# Patient Record
Sex: FEMALE | Race: WHITE | NOT HISPANIC OR LATINO | Employment: FULL TIME | URBAN - METROPOLITAN AREA
[De-identification: names, ages, dates, MRNs, and addresses within clinical notes are randomized per-mention and may not be internally consistent; named-entity substitution may affect disease eponyms.]

---

## 2020-02-21 LAB
INFLUENZA A ANTIGEN, POC: NEGATIVE
INFLUENZA B ANTIGEN, POC: NEGATIVE

## 2020-02-22 LAB
INFLUENZA A ANTIGEN, POC: NEGATIVE
INFLUENZA B ANTIGEN, POC: NEGATIVE

## 2020-06-05 ENCOUNTER — HISTORICAL (OUTPATIENT)
Dept: ADMINISTRATIVE | Facility: HOSPITAL | Age: 40
End: 2020-06-05

## 2020-06-05 LAB
ABS NEUT (OLG): 2.57 X10(3)/MCL (ref 2.1–9.2)
ALBUMIN SERPL-MCNC: 4.1 GM/DL (ref 3.5–5)
ALBUMIN/GLOB SERPL: 1.5 RATIO (ref 1.1–2)
ALP SERPL-CCNC: 46 UNIT/L (ref 40–150)
ALT SERPL-CCNC: 11 UNIT/L (ref 0–55)
ANTINUCLEAR ANTIBODY SCREEN (OHS): NEGATIVE
AST SERPL-CCNC: 13 UNIT/L (ref 5–34)
BASOPHILS # BLD AUTO: 0 X10(3)/MCL (ref 0–0.2)
BASOPHILS NFR BLD AUTO: 1 %
BILIRUB SERPL-MCNC: 0.9 MG/DL
BILIRUBIN DIRECT+TOT PNL SERPL-MCNC: 0.3 MG/DL (ref 0–0.5)
BILIRUBIN DIRECT+TOT PNL SERPL-MCNC: 0.6 MG/DL (ref 0–0.8)
BUN SERPL-MCNC: 19.3 MG/DL (ref 7–18.7)
CALCIUM SERPL-MCNC: 8.7 MG/DL (ref 8.4–10.2)
CHLORIDE SERPL-SCNC: 104 MMOL/L (ref 98–107)
CHOLEST SERPL-MCNC: 182 MG/DL
CHOLEST/HDLC SERPL: 3 {RATIO} (ref 0–5)
CO2 SERPL-SCNC: 27 MMOL/L (ref 22–29)
CREAT SERPL-MCNC: 0.84 MG/DL (ref 0.55–1.02)
CRP SERPL HS-MCNC: <0.2 MG/DL
DEPRECATED CALCIDIOL+CALCIFEROL SERPL-MC: 73.2 NG/ML (ref 6.6–49.9)
DSDNA ANTIBODY (OHS): NEGATIVE
EOSINOPHIL # BLD AUTO: 0.1 X10(3)/MCL (ref 0–0.9)
EOSINOPHIL NFR BLD AUTO: 3 %
ERYTHROCYTE [DISTWIDTH] IN BLOOD BY AUTOMATED COUNT: 12.6 % (ref 11.5–17)
ERYTHROCYTE [SEDIMENTATION RATE] IN BLOOD: 4 MM/HR (ref 0–20)
GLOBULIN SER-MCNC: 2.7 GM/DL (ref 2.4–3.5)
GLUCOSE SERPL-MCNC: 81 MG/DL (ref 74–100)
HCT VFR BLD AUTO: 40.4 % (ref 37–47)
HDLC SERPL-MCNC: 65 MG/DL (ref 35–60)
HGB BLD-MCNC: 13.2 GM/DL (ref 12–16)
LDLC SERPL CALC-MCNC: 106 MG/DL (ref 50–140)
LYMPHOCYTES # BLD AUTO: 1.3 X10(3)/MCL (ref 0.6–4.6)
LYMPHOCYTES NFR BLD AUTO: 30 %
MAGNESIUM SERPL-MCNC: 2.04 MG/DL (ref 1.6–2.6)
MCH RBC QN AUTO: 31.4 PG (ref 27–31)
MCHC RBC AUTO-ENTMCNC: 32.7 GM/DL (ref 33–36)
MCV RBC AUTO: 96 FL (ref 80–94)
MONOCYTES # BLD AUTO: 0.4 X10(3)/MCL (ref 0.1–1.3)
MONOCYTES NFR BLD AUTO: 9 %
NEUTROPHILS # BLD AUTO: 2.57 X10(3)/MCL (ref 2.1–9.2)
NEUTROPHILS NFR BLD AUTO: 58 %
PHOSPHATE SERPL-MCNC: 3.7 MG/DL (ref 2.3–4.7)
PLATELET # BLD AUTO: 181 X10(3)/MCL (ref 130–400)
PMV BLD AUTO: 10.3 FL (ref 9.4–12.4)
POTASSIUM SERPL-SCNC: 4.1 MMOL/L (ref 3.5–5.1)
PROGEST SERPL-MCNC: <0.5 NG/ML
PROT SERPL-MCNC: 6.8 GM/DL (ref 6.4–8.3)
RBC # BLD AUTO: 4.21 X10(6)/MCL (ref 4.2–5.4)
RHEUMATOID FACT SERPL-ACNC: <13 IU/ML
SODIUM SERPL-SCNC: 141 MMOL/L (ref 136–145)
T3FREE SERPL-MCNC: 3.56 PG/ML (ref 1.71–3.71)
T4 FREE SERPL-MCNC: 1.06 NG/DL (ref 0.7–1.48)
TRIGL SERPL-MCNC: 57 MG/DL (ref 37–140)
TSH SERPL-ACNC: 0.04 UIU/ML (ref 0.35–4.94)
VIT B12 SERPL-MCNC: 993 PG/ML (ref 213–816)
VLDLC SERPL CALC-MCNC: 11 MG/DL
WBC # SPEC AUTO: 4.4 X10(3)/MCL (ref 4.5–11.5)

## 2020-07-27 ENCOUNTER — HISTORICAL (OUTPATIENT)
Dept: ADMINISTRATIVE | Facility: HOSPITAL | Age: 40
End: 2020-07-27

## 2020-07-27 LAB
T3FREE SERPL-MCNC: 3.5 PG/ML (ref 1.71–3.71)
T4 FREE SERPL-MCNC: 1.03 NG/DL (ref 0.7–1.48)
TSH SERPL-ACNC: 0.33 UIU/ML (ref 0.35–4.94)

## 2020-11-27 ENCOUNTER — HISTORICAL (OUTPATIENT)
Dept: ADMINISTRATIVE | Facility: HOSPITAL | Age: 40
End: 2020-11-27

## 2020-11-27 LAB
T3FREE SERPL-MCNC: 3.1 PG/ML (ref 1.71–3.71)
T4 FREE SERPL-MCNC: 0.94 NG/DL (ref 0.7–1.48)
TSH SERPL-ACNC: 0.29 UIU/ML (ref 0.35–4.94)

## 2021-07-21 ENCOUNTER — HISTORICAL (OUTPATIENT)
Dept: ADMINISTRATIVE | Facility: HOSPITAL | Age: 41
End: 2021-07-21

## 2021-07-21 LAB
T3FREE SERPL-MCNC: 3.12 PG/ML (ref 1.58–3.91)
T4 FREE SERPL-MCNC: 0.91 NG/DL (ref 0.7–1.48)
TSH SERPL-ACNC: 1.82 UIU/ML (ref 0.35–4.94)

## 2022-04-10 ENCOUNTER — HISTORICAL (OUTPATIENT)
Dept: ADMINISTRATIVE | Facility: HOSPITAL | Age: 42
End: 2022-04-10

## 2022-04-28 VITALS
OXYGEN SATURATION: 99 % | BODY MASS INDEX: 24.68 KG/M2 | DIASTOLIC BLOOD PRESSURE: 67 MMHG | SYSTOLIC BLOOD PRESSURE: 107 MMHG | WEIGHT: 125.69 LBS | HEIGHT: 60 IN

## 2022-09-12 ENCOUNTER — OFFICE VISIT (OUTPATIENT)
Dept: URGENT CARE | Facility: CLINIC | Age: 42
End: 2022-09-12
Payer: COMMERCIAL

## 2022-09-12 VITALS
DIASTOLIC BLOOD PRESSURE: 81 MMHG | RESPIRATION RATE: 20 BRPM | OXYGEN SATURATION: 100 % | WEIGHT: 115 LBS | HEART RATE: 81 BPM | HEIGHT: 60 IN | TEMPERATURE: 99 F | SYSTOLIC BLOOD PRESSURE: 122 MMHG | BODY MASS INDEX: 22.58 KG/M2

## 2022-09-12 DIAGNOSIS — J01.90 ACUTE RHINOSINUSITIS: Primary | ICD-10-CM

## 2022-09-12 DIAGNOSIS — R09.81 CONGESTION OF NASAL SINUS: ICD-10-CM

## 2022-09-12 LAB
CTP QC/QA: YES
SARS-COV-2 RDRP RESP QL NAA+PROBE: NEGATIVE

## 2022-09-12 PROCEDURE — 1160F RVW MEDS BY RX/DR IN RCRD: CPT | Mod: CPTII,,, | Performed by: FAMILY MEDICINE

## 2022-09-12 PROCEDURE — 3079F DIAST BP 80-89 MM HG: CPT | Mod: CPTII,,, | Performed by: FAMILY MEDICINE

## 2022-09-12 PROCEDURE — 1159F MED LIST DOCD IN RCRD: CPT | Mod: CPTII,,, | Performed by: FAMILY MEDICINE

## 2022-09-12 PROCEDURE — 99213 OFFICE O/P EST LOW 20 MIN: CPT | Mod: 25,,, | Performed by: FAMILY MEDICINE

## 2022-09-12 PROCEDURE — 1160F PR REVIEW ALL MEDS BY PRESCRIBER/CLIN PHARMACIST DOCUMENTED: ICD-10-PCS | Mod: CPTII,,, | Performed by: FAMILY MEDICINE

## 2022-09-12 PROCEDURE — U0002 COVID-19 LAB TEST NON-CDC: HCPCS | Mod: QW,,, | Performed by: FAMILY MEDICINE

## 2022-09-12 PROCEDURE — 3074F PR MOST RECENT SYSTOLIC BLOOD PRESSURE < 130 MM HG: ICD-10-PCS | Mod: CPTII,,, | Performed by: FAMILY MEDICINE

## 2022-09-12 PROCEDURE — 3074F SYST BP LT 130 MM HG: CPT | Mod: CPTII,,, | Performed by: FAMILY MEDICINE

## 2022-09-12 PROCEDURE — 3008F PR BODY MASS INDEX (BMI) DOCUMENTED: ICD-10-PCS | Mod: CPTII,,, | Performed by: FAMILY MEDICINE

## 2022-09-12 PROCEDURE — 3008F BODY MASS INDEX DOCD: CPT | Mod: CPTII,,, | Performed by: FAMILY MEDICINE

## 2022-09-12 PROCEDURE — U0002: ICD-10-PCS | Mod: QW,,, | Performed by: FAMILY MEDICINE

## 2022-09-12 PROCEDURE — 3079F PR MOST RECENT DIASTOLIC BLOOD PRESSURE 80-89 MM HG: ICD-10-PCS | Mod: CPTII,,, | Performed by: FAMILY MEDICINE

## 2022-09-12 PROCEDURE — 96372 THER/PROPH/DIAG INJ SC/IM: CPT | Mod: ,,, | Performed by: FAMILY MEDICINE

## 2022-09-12 PROCEDURE — 96372 PR INJECTION,THERAP/PROPH/DIAG2ST, IM OR SUBCUT: ICD-10-PCS | Mod: ,,, | Performed by: FAMILY MEDICINE

## 2022-09-12 PROCEDURE — 99213 PR OFFICE/OUTPT VISIT, EST, LEVL III, 20-29 MIN: ICD-10-PCS | Mod: 25,,, | Performed by: FAMILY MEDICINE

## 2022-09-12 PROCEDURE — 1159F PR MEDICATION LIST DOCUMENTED IN MEDICAL RECORD: ICD-10-PCS | Mod: CPTII,,, | Performed by: FAMILY MEDICINE

## 2022-09-12 RX ORDER — BUPROPION HYDROCHLORIDE 300 MG/1
300 TABLET ORAL DAILY
COMMUNITY
Start: 2022-06-27 | End: 2023-03-15

## 2022-09-12 RX ORDER — CITALOPRAM 10 MG/1
10 TABLET ORAL DAILY
COMMUNITY
Start: 2022-06-27 | End: 2023-03-15

## 2022-09-12 RX ORDER — BETAMETHASONE SODIUM PHOSPHATE AND BETAMETHASONE ACETATE 3; 3 MG/ML; MG/ML
6 INJECTION, SUSPENSION INTRA-ARTICULAR; INTRALESIONAL; INTRAMUSCULAR; SOFT TISSUE
Status: COMPLETED | OUTPATIENT
Start: 2022-09-12 | End: 2022-09-12

## 2022-09-12 RX ORDER — DEXTROAMPHETAMINE SACCHARATE, AMPHETAMINE ASPARTATE, DEXTROAMPHETAMINE SULFATE AND AMPHETAMINE SULFATE 5; 5; 5; 5 MG/1; MG/1; MG/1; MG/1
1 TABLET ORAL 2 TIMES DAILY
COMMUNITY
Start: 2022-08-28 | End: 2023-03-15

## 2022-09-12 RX ADMIN — BETAMETHASONE SODIUM PHOSPHATE AND BETAMETHASONE ACETATE 6 MG: 3; 3 INJECTION, SUSPENSION INTRA-ARTICULAR; INTRALESIONAL; INTRAMUSCULAR; SOFT TISSUE at 06:09

## 2022-09-12 NOTE — PROGRESS NOTES
Subjective:       Patient ID: Lyssa Paul is a 41 y.o. female.    Vitals:  height is 5' (1.524 m) and weight is 52.2 kg (115 lb). Her temperature is 98.5 °F (36.9 °C). Her blood pressure is 122/81 and her pulse is 81. Her respiration is 20 and oxygen saturation is 100%.     Chief Complaint: Sinus Problem (Sinus congestion, ears bothering her, sore throat, no fever about a wk)    HPI:  41-year-old female present to clinic with concerns of nasal congestion, sinus congestion, bilateral ear pressure and sore throat since 1 week.  Gradual in onset and worsening.  No measured fever at home.  No concerns of positive exposure to infections.  Vaccinated for COVID-19.      ROS  :  Constitutional : _ No fever , Body aches, Chills  HENT_sore throat, postnasal drainage  Respiratory_no wheezing, no shortness of breath  Cardiovascular_no chest pain  Gastrointestinal_ No vomiting, No diarrhea, No abdominal pain  Musculoskeletal_no joint pain, no joint swelling  Integumentary_no skin rash     Objective:      Physical Exam    General : Alert and Oriented, No apparent distress, afebrile, stuffy and congested, appears comfortable sitting on exam table, clear speech and appropriate communication  Neck - supple  HENT : Oropharynx no redness or swelling. Tonsils not enlarged, no exudate. TMs intact mild fluid no redness.   Respiratory : Bilateral equal breath sounds, nonlabored respirations  Cardiovascular : Rate, rhythm regular, normal volume pulse, no murmur  Integumentary : Warm, Dry and no rash    Assessment:       1. Acute rhinosinusitis    2. Congestion of nasal sinus          Plan:     Discussed the physical finding , Condition and course  Concerns of allergies.  Monitor the symptoms closely  Tylenol and ibuprofen for fever, body aches and sore throat.   Warm saltwater gargles for sore throat.   Claritin 10 mg or Zyrtec 10 mg for nasal congestion  Robitussin-DM for cough and cold as needed and as directed  [facility]  today risk and benefits discussed voiced understanding  For worsening symptoms and signs of infection call clinic will consider antibiotics  COVID-19 test negative    Acute rhinosinusitis  -     betamethasone acetate-betamethasone sodium phosphate injection 6 mg    Congestion of nasal sinus  -     POCT COVID-19 Rapid Screening

## 2022-09-12 NOTE — PATIENT INSTRUCTIONS
Discussed the physical finding , Condition and course  Concerns of allergies.  Monitor the symptoms closely  Tylenol and ibuprofen for fever, body aches and sore throat.   Warm saltwater gargles for sore throat.   Claritin 10 mg or Zyrtec 10 mg for nasal congestion  Robitussin-DM for cough and cold as needed and as directed  [facility] today risk and benefits discussed voiced understanding  For worsening symptoms and signs of infection call clinic will consider antibiotics  COVID-19 test negative

## 2022-09-21 ENCOUNTER — HISTORICAL (OUTPATIENT)
Dept: ADMINISTRATIVE | Facility: HOSPITAL | Age: 42
End: 2022-09-21
Payer: COMMERCIAL

## 2023-02-06 ENCOUNTER — HOSPITAL ENCOUNTER (OUTPATIENT)
Dept: RADIOLOGY | Facility: HOSPITAL | Age: 43
Discharge: HOME | End: 2023-02-06
Attending: OTOLARYNGOLOGY
Payer: COMMERCIAL

## 2023-02-06 DIAGNOSIS — Z79.01 LONG TERM (CURRENT) USE OF ANTICOAGULANTS: ICD-10-CM

## 2023-02-06 DIAGNOSIS — Z01.818 OTHER SPECIFIED PRE-OPERATIVE EXAMINATION: ICD-10-CM

## 2023-02-06 DIAGNOSIS — Z01.818 OTHER SPECIFIED PRE-OPERATIVE EXAMINATION: Primary | ICD-10-CM

## 2023-02-06 PROCEDURE — 71046 X-RAY EXAM CHEST 2 VIEWS: CPT | Mod: TC

## 2023-02-09 RX ORDER — AMPHETAMINE SULFATE 10 MG/1
10 TABLET ORAL 2 TIMES DAILY
COMMUNITY

## 2023-02-15 ENCOUNTER — ANESTHESIA EVENT (OUTPATIENT)
Dept: SURGERY | Facility: HOSPITAL | Age: 43
End: 2023-02-15
Payer: COMMERCIAL

## 2023-02-15 NOTE — DISCHARGE INSTRUCTIONS
Endoscopic Sinus Surgery Discharge Instructions     About this topic   Endoscopic sinus surgery can correct problems with your sinuses when drugs do not help. The sinuses are air-filled spaces inside the head that lie behind your forehead, nose, cheeks, and eyes. The spaces have small hairs that clean the sinuses. Your sinuses may swell, get inflamed or infected if the small hairs are not working well. You can also have problems if there is a block in the opening to the sinuses. The mucus sometimes gets trapped inside the sinus, causing pain.     What care is needed at home?   Do not blow your nose or sneeze for 7 to 10 days. If you must sneeze, keep your mouth open.  Change your dressing as needed for the next few days. Once the bleeding stops then you will not need a dressing anymore.   Breathe through your mouth for the first few days.  Place an ice pack wrapped in a towel over the painful part. Never put ice right on the skin. Do not leave the ice on more than 10 to 15 minutes at a time. This will help relieve pain and swelling.  Protect your nose from injury.  Avoid activities that put pressure on your face, like bending over or holding your breath.  You can take a bath or shower but make sure it's not too hot.   Ask the doctor when you should start to use a saline rinse for your nose. This may be right after any packing has been removed. You may need to use the rinse 3 to 4 times each day to help prevent crusts from forming inside of your nose.  You can use a humidifier.  Ask the doctor when you can return to your normal activities or return to work.    What follow-up care is needed?   Keep your scheduled follow up appointment.   Your doctor may remove any packing or stents (small tubes) at one of these visits. Follow the instructions your doctor told you.    Will physical activity be limited?   Talk with your doctor about the right amount of activity for you.  Do not lift anything over 10 pounds (4.5 kg) for  2 to 4 weeks.  Do not bend over toward the floor.  Avoid activities that may jerk your head, like playing sports.    What problems could happen?   Infection  Bleeding  Numbness in the nose  Eye problems  Bruising around eyes  Cerebrospinal fluid leaks from nose    Patient Education       Septoplasty Discharge Instructions   About this topic   Septoplasty is done to correct problems with the septum inside your nose. Your septum is the wall that divides your nostrils. It is made of bone and hard tissue called cartilage. The surgery is done to:  Fix a septum that is out of place and is blocking your nose  Straighten your septum  Repair a crooked, bent, or deformed septum that blocks your ability to breathe  Repair a hole in your septum  Control sinus infections  Control irregular nose bleeding     What care is needed at home?   Avoid lying face down for the first few days after the surgery.  Sleep in a recliner or in a reclined position for the first few nights.  Breathe through your mouth for the first few days.  Place an ice pack wrapped in a towel over the painful part. Never put ice right on the skin. Do not leave the ice on more than 10 to 15 minutes at a time. This will help relieve pain and swelling.  Do not blow, touch, or rub your nose. This can cause more swelling and bleeding. Try not to sneeze.  Protect your nose from injury.  Avoid activities that put pressure on your face, like bending over or holding your breath.  Wound care:   You can start taking a shower tomorrow.  Ask the doctor when you can return to your normal activities or return to work.  What follow-up care is needed?   Be sure to keep the follow up visit.   Will physical activity be limited?   Talk with your doctor about the right amount of activity for you.  Do not lift anything heavy for 2 to 4 weeks.   Do not stay in the sun for more than 15 minutes. This will help to avoid getting sunburn on your nose.  What problems could happen?    Infection  Bleeding  Numbness in the nose  Hole in nasal septum  Scarring in nose that that causes blockage  Changes in taste or smell  When do I need to call the doctor?   Signs of infection. These include a fever of 100.4°F (38°C) or higher, chills.  Signs of wound infection. These include swelling, redness, warmth around the wound; too much pain when touched; yellowish, greenish, or bloody discharge; foul smell coming from the cut site; cut site opens up.  Lots of bleeding  Packing comes out before it should  Upset stomach and throwing up not helped with drugs  Too much pain  Cough, shortness of breath, chest pain  Health problem is not better or you are feeling worse     When do I need to call the doctor?   Signs of infection. These include a fever of 100.4°F (38°C) or higher, chills.  Lots of bleeding from your nose  Packing comes out before it should  Upset stomach and throwing up not helped with drugs  Too much pain or headache  Swelling or redness of the eyes or nose  Clear fluid draining from one side of your nose  Cough, shortness of breath, chest pain  You are not feeling better in 2 to 3 days or you are feeling worse

## 2023-02-16 ENCOUNTER — HOSPITAL ENCOUNTER (OUTPATIENT)
Facility: HOSPITAL | Age: 43
Discharge: HOME | End: 2023-02-16
Attending: OTOLARYNGOLOGY | Admitting: OTOLARYNGOLOGY
Payer: COMMERCIAL

## 2023-02-16 ENCOUNTER — ANESTHESIA (OUTPATIENT)
Dept: SURGERY | Facility: HOSPITAL | Age: 43
End: 2023-02-16
Payer: COMMERCIAL

## 2023-02-16 DIAGNOSIS — J32.9 CHRONIC SINUSITIS, UNSPECIFIED LOCATION: ICD-10-CM

## 2023-02-16 LAB
B-HCG UR QL: NEGATIVE
CTP QC/QA: YES

## 2023-02-16 PROCEDURE — 63600175 PHARM REV CODE 636 W HCPCS: Performed by: NURSE ANESTHETIST, CERTIFIED REGISTERED

## 2023-02-16 PROCEDURE — 25000003 PHARM REV CODE 250

## 2023-02-16 PROCEDURE — 36000709 HC OR TIME LEV III EA ADD 15 MIN: Performed by: OTOLARYNGOLOGY

## 2023-02-16 PROCEDURE — 36000708 HC OR TIME LEV III 1ST 15 MIN: Performed by: OTOLARYNGOLOGY

## 2023-02-16 PROCEDURE — 81025 URINE PREGNANCY TEST: CPT | Performed by: OTOLARYNGOLOGY

## 2023-02-16 PROCEDURE — C9046 COCAINE HCL NASAL SOLUTION: HCPCS | Mod: TB | Performed by: OTOLARYNGOLOGY

## 2023-02-16 PROCEDURE — 25000003 PHARM REV CODE 250: Performed by: NURSE ANESTHETIST, CERTIFIED REGISTERED

## 2023-02-16 PROCEDURE — 37000009 HC ANESTHESIA EA ADD 15 MINS: Performed by: OTOLARYNGOLOGY

## 2023-02-16 PROCEDURE — 25000003 PHARM REV CODE 250: Performed by: ANESTHESIOLOGY

## 2023-02-16 PROCEDURE — 25000003 PHARM REV CODE 250: Mod: TB | Performed by: OTOLARYNGOLOGY

## 2023-02-16 PROCEDURE — 71000015 HC POSTOP RECOV 1ST HR: Performed by: OTOLARYNGOLOGY

## 2023-02-16 PROCEDURE — 63600175 PHARM REV CODE 636 W HCPCS

## 2023-02-16 PROCEDURE — 71000033 HC RECOVERY, INTIAL HOUR: Performed by: OTOLARYNGOLOGY

## 2023-02-16 PROCEDURE — 63600175 PHARM REV CODE 636 W HCPCS: Performed by: OTOLARYNGOLOGY

## 2023-02-16 PROCEDURE — 27201423 OPTIME MED/SURG SUP & DEVICES STERILE SUPPLY: Performed by: OTOLARYNGOLOGY

## 2023-02-16 PROCEDURE — 37000008 HC ANESTHESIA 1ST 15 MINUTES: Performed by: OTOLARYNGOLOGY

## 2023-02-16 PROCEDURE — 71000016 HC POSTOP RECOV ADDL HR: Performed by: OTOLARYNGOLOGY

## 2023-02-16 PROCEDURE — 63600175 PHARM REV CODE 636 W HCPCS: Performed by: ANESTHESIOLOGY

## 2023-02-16 RX ORDER — MEPERIDINE HYDROCHLORIDE 25 MG/ML
12.5 INJECTION INTRAMUSCULAR; INTRAVENOUS; SUBCUTANEOUS EVERY 10 MIN PRN
Status: DISCONTINUED | OUTPATIENT
Start: 2023-02-16 | End: 2023-02-16 | Stop reason: HOSPADM

## 2023-02-16 RX ORDER — OXYMETAZOLINE HCL 0.05 %
SPRAY, NON-AEROSOL (ML) NASAL
Status: COMPLETED
Start: 2023-02-16 | End: 2023-02-16

## 2023-02-16 RX ORDER — ONDANSETRON HYDROCHLORIDE 2 MG/ML
INJECTION, SOLUTION INTRAMUSCULAR; INTRAVENOUS
Status: DISCONTINUED | OUTPATIENT
Start: 2023-02-16 | End: 2023-02-16

## 2023-02-16 RX ORDER — MIDAZOLAM HYDROCHLORIDE 1 MG/ML
2 INJECTION INTRAMUSCULAR; INTRAVENOUS ONCE AS NEEDED
Status: COMPLETED | OUTPATIENT
Start: 2023-02-16 | End: 2023-02-16

## 2023-02-16 RX ORDER — ROCURONIUM BROMIDE 10 MG/ML
INJECTION, SOLUTION INTRAVENOUS
Status: DISCONTINUED | OUTPATIENT
Start: 2023-02-16 | End: 2023-02-16

## 2023-02-16 RX ORDER — FAMOTIDINE 10 MG/ML
INJECTION INTRAVENOUS
Status: DISCONTINUED
Start: 2023-02-16 | End: 2023-02-16 | Stop reason: HOSPADM

## 2023-02-16 RX ORDER — DIAZEPAM 5 MG/1
10 TABLET ORAL
Status: DISCONTINUED | OUTPATIENT
Start: 2023-02-16 | End: 2023-02-16 | Stop reason: HOSPADM

## 2023-02-16 RX ORDER — LIDOCAINE HYDROCHLORIDE AND EPINEPHRINE 5; 5 MG/ML; UG/ML
INJECTION, SOLUTION INFILTRATION; PERINEURAL
Status: DISCONTINUED | OUTPATIENT
Start: 2023-02-16 | End: 2023-02-16 | Stop reason: HOSPADM

## 2023-02-16 RX ORDER — HYDROMORPHONE HYDROCHLORIDE 2 MG/ML
0.2 INJECTION, SOLUTION INTRAMUSCULAR; INTRAVENOUS; SUBCUTANEOUS EVERY 5 MIN PRN
Status: DISCONTINUED | OUTPATIENT
Start: 2023-02-16 | End: 2023-02-16 | Stop reason: HOSPADM

## 2023-02-16 RX ORDER — DEXAMETHASONE SODIUM PHOSPHATE 4 MG/ML
INJECTION, SOLUTION INTRA-ARTICULAR; INTRALESIONAL; INTRAMUSCULAR; INTRAVENOUS; SOFT TISSUE
Status: DISCONTINUED | OUTPATIENT
Start: 2023-02-16 | End: 2023-02-16

## 2023-02-16 RX ORDER — OXYMETAZOLINE HCL 0.05 %
2 SPRAY, NON-AEROSOL (ML) NASAL
Status: COMPLETED | OUTPATIENT
Start: 2023-02-16 | End: 2023-02-16

## 2023-02-16 RX ORDER — SODIUM CHLORIDE, SODIUM LACTATE, POTASSIUM CHLORIDE, CALCIUM CHLORIDE 600; 310; 30; 20 MG/100ML; MG/100ML; MG/100ML; MG/100ML
INJECTION, SOLUTION INTRAVENOUS CONTINUOUS
Status: DISCONTINUED | OUTPATIENT
Start: 2023-02-16 | End: 2023-02-16 | Stop reason: HOSPADM

## 2023-02-16 RX ORDER — SCOLOPAMINE TRANSDERMAL SYSTEM 1 MG/1
1 PATCH, EXTENDED RELEASE TRANSDERMAL
Status: DISCONTINUED | OUTPATIENT
Start: 2023-02-16 | End: 2023-02-16 | Stop reason: HOSPADM

## 2023-02-16 RX ORDER — SCOLOPAMINE TRANSDERMAL SYSTEM 1 MG/1
PATCH, EXTENDED RELEASE TRANSDERMAL
Status: COMPLETED
Start: 2023-02-16 | End: 2023-02-16

## 2023-02-16 RX ORDER — FENTANYL CITRATE 50 UG/ML
INJECTION, SOLUTION INTRAMUSCULAR; INTRAVENOUS
Status: DISCONTINUED | OUTPATIENT
Start: 2023-02-16 | End: 2023-02-16

## 2023-02-16 RX ORDER — LIDOCAINE HYDROCHLORIDE 10 MG/ML
1 INJECTION, SOLUTION EPIDURAL; INFILTRATION; INTRACAUDAL; PERINEURAL ONCE
Status: DISCONTINUED | OUTPATIENT
Start: 2023-02-16 | End: 2023-02-16 | Stop reason: HOSPADM

## 2023-02-16 RX ORDER — PROPOFOL 10 MG/ML
VIAL (ML) INTRAVENOUS
Status: DISCONTINUED | OUTPATIENT
Start: 2023-02-16 | End: 2023-02-16

## 2023-02-16 RX ORDER — COCAINE HYDROCHLORIDE 40 MG/ML
SOLUTION NASAL
Status: DISCONTINUED
Start: 2023-02-16 | End: 2023-02-16 | Stop reason: HOSPADM

## 2023-02-16 RX ORDER — HYDROMORPHONE HYDROCHLORIDE 2 MG/ML
0.4 INJECTION, SOLUTION INTRAMUSCULAR; INTRAVENOUS; SUBCUTANEOUS EVERY 5 MIN PRN
Status: DISCONTINUED | OUTPATIENT
Start: 2023-02-16 | End: 2023-02-16 | Stop reason: HOSPADM

## 2023-02-16 RX ORDER — SILVER NITRATE 38.21; 12.74 MG/1; MG/1
STICK TOPICAL
Status: DISCONTINUED
Start: 2023-02-16 | End: 2023-02-16 | Stop reason: HOSPADM

## 2023-02-16 RX ORDER — PROCHLORPERAZINE EDISYLATE 5 MG/ML
5 INJECTION INTRAMUSCULAR; INTRAVENOUS EVERY 30 MIN PRN
Status: DISCONTINUED | OUTPATIENT
Start: 2023-02-16 | End: 2023-02-16 | Stop reason: HOSPADM

## 2023-02-16 RX ORDER — LIDOCAINE HYDROCHLORIDE 10 MG/ML
INJECTION, SOLUTION EPIDURAL; INFILTRATION; INTRACAUDAL; PERINEURAL
Status: DISCONTINUED | OUTPATIENT
Start: 2023-02-16 | End: 2023-02-16

## 2023-02-16 RX ORDER — ACETAMINOPHEN 10 MG/ML
1000 INJECTION, SOLUTION INTRAVENOUS ONCE
Status: COMPLETED | OUTPATIENT
Start: 2023-02-16 | End: 2023-02-16

## 2023-02-16 RX ORDER — CEFAZOLIN 2 G/1
2 INJECTION, POWDER, FOR SOLUTION INTRAMUSCULAR; INTRAVENOUS
Status: COMPLETED | OUTPATIENT
Start: 2023-02-16 | End: 2023-02-16

## 2023-02-16 RX ORDER — DIAZEPAM 5 MG/1
TABLET ORAL
Status: COMPLETED
Start: 2023-02-16 | End: 2023-02-16

## 2023-02-16 RX ORDER — LIDOCAINE HYDROCHLORIDE AND EPINEPHRINE 5; 5 MG/ML; UG/ML
INJECTION, SOLUTION INFILTRATION; PERINEURAL
Status: DISCONTINUED
Start: 2023-02-16 | End: 2023-02-16 | Stop reason: HOSPADM

## 2023-02-16 RX ORDER — SILVER NITRATE 38.21; 12.74 MG/1; MG/1
STICK TOPICAL
Status: DISCONTINUED | OUTPATIENT
Start: 2023-02-16 | End: 2023-02-16 | Stop reason: HOSPADM

## 2023-02-16 RX ORDER — ONDANSETRON 4 MG/1
8 TABLET, ORALLY DISINTEGRATING ORAL EVERY 6 HOURS PRN
Status: DISCONTINUED | OUTPATIENT
Start: 2023-02-16 | End: 2023-02-16 | Stop reason: HOSPADM

## 2023-02-16 RX ORDER — SODIUM CHLORIDE, SODIUM GLUCONATE, SODIUM ACETATE, POTASSIUM CHLORIDE AND MAGNESIUM CHLORIDE 30; 37; 368; 526; 502 MG/100ML; MG/100ML; MG/100ML; MG/100ML; MG/100ML
INJECTION, SOLUTION INTRAVENOUS CONTINUOUS
Status: DISCONTINUED | OUTPATIENT
Start: 2023-02-16 | End: 2023-02-16 | Stop reason: HOSPADM

## 2023-02-16 RX ORDER — MIDAZOLAM HYDROCHLORIDE 1 MG/ML
INJECTION INTRAMUSCULAR; INTRAVENOUS
Status: COMPLETED
Start: 2023-02-16 | End: 2023-02-16

## 2023-02-16 RX ORDER — ONDANSETRON 2 MG/ML
4 INJECTION INTRAMUSCULAR; INTRAVENOUS DAILY PRN
Status: DISCONTINUED | OUTPATIENT
Start: 2023-02-16 | End: 2023-02-16 | Stop reason: HOSPADM

## 2023-02-16 RX ORDER — COCAINE HYDROCHLORIDE 40 MG/ML
SOLUTION NASAL
Status: DISCONTINUED | OUTPATIENT
Start: 2023-02-16 | End: 2023-02-16 | Stop reason: HOSPADM

## 2023-02-16 RX ORDER — GABAPENTIN 300 MG/1
600 CAPSULE ORAL ONCE
Status: COMPLETED | OUTPATIENT
Start: 2023-02-16 | End: 2023-02-16

## 2023-02-16 RX ADMIN — HYDROMORPHONE HYDROCHLORIDE 0.4 MG: 2 INJECTION INTRAMUSCULAR; INTRAVENOUS; SUBCUTANEOUS at 10:02

## 2023-02-16 RX ADMIN — ACETAMINOPHEN 1000 MG: 10 INJECTION, SOLUTION INTRAVENOUS at 07:02

## 2023-02-16 RX ADMIN — DIAZEPAM 10 MG: 5 TABLET ORAL at 07:02

## 2023-02-16 RX ADMIN — MIDAZOLAM HYDROCHLORIDE 2 MG: 1 INJECTION, SOLUTION INTRAMUSCULAR; INTRAVENOUS at 07:02

## 2023-02-16 RX ADMIN — GABAPENTIN 600 MG: 300 CAPSULE ORAL at 07:02

## 2023-02-16 RX ADMIN — LIDOCAINE HYDROCHLORIDE 20 MG: 10 INJECTION, SOLUTION EPIDURAL; INFILTRATION; INTRACAUDAL; PERINEURAL at 08:02

## 2023-02-16 RX ADMIN — DEXAMETHASONE SODIUM PHOSPHATE 12 MG: 4 INJECTION, SOLUTION INTRA-ARTICULAR; INTRALESIONAL; INTRAMUSCULAR; INTRAVENOUS; SOFT TISSUE at 08:02

## 2023-02-16 RX ADMIN — PROPOFOL 150 MG: 10 INJECTION, EMULSION INTRAVENOUS at 08:02

## 2023-02-16 RX ADMIN — MIDAZOLAM HYDROCHLORIDE 2 MG: 1 INJECTION INTRAMUSCULAR; INTRAVENOUS at 07:02

## 2023-02-16 RX ADMIN — FENTANYL CITRATE 100 MCG: 50 INJECTION, SOLUTION INTRAMUSCULAR; INTRAVENOUS at 08:02

## 2023-02-16 RX ADMIN — ONDANSETRON 4 MG: 2 INJECTION INTRAMUSCULAR; INTRAVENOUS at 09:02

## 2023-02-16 RX ADMIN — SODIUM CHLORIDE, POTASSIUM CHLORIDE, SODIUM LACTATE AND CALCIUM CHLORIDE: 600; 310; 30; 20 INJECTION, SOLUTION INTRAVENOUS at 01:02

## 2023-02-16 RX ADMIN — SUGAMMADEX 200 MG: 100 INJECTION, SOLUTION INTRAVENOUS at 09:02

## 2023-02-16 RX ADMIN — ONDANSETRON 4 MG: 2 INJECTION INTRAMUSCULAR; INTRAVENOUS at 01:02

## 2023-02-16 RX ADMIN — CEFAZOLIN 1 G: 2 INJECTION, POWDER, FOR SOLUTION INTRAMUSCULAR; INTRAVENOUS at 08:02

## 2023-02-16 RX ADMIN — Medication: at 07:02

## 2023-02-16 RX ADMIN — ROCURONIUM BROMIDE 40 MG: 50 INJECTION INTRAVENOUS at 08:02

## 2023-02-16 RX ADMIN — SCOPOLAMINE 1 PATCH: 1 PATCH TRANSDERMAL at 07:02

## 2023-02-16 RX ADMIN — Medication 2 SPRAY: at 07:02

## 2023-02-16 RX ADMIN — MEPERIDINE HYDROCHLORIDE 12.5 MG: 25 INJECTION INTRAMUSCULAR; INTRAVENOUS; SUBCUTANEOUS at 10:02

## 2023-02-16 NOTE — PLAN OF CARE
Pt is [id number] score 9/10-pt 's pain continues on arousal-pain mgmt plan given to her and will continue oxygen in phase2-pt verbalizes understanding-surgical site wdl-she meets criteria for phase2 care per dr campos-to rm 9 with danyelle

## 2023-02-16 NOTE — ANESTHESIA PREPROCEDURE EVALUATION
02/16/2023  Lyssa Paul is a 42 y.o., female presents as an outpatient for endoscopic sinus surgery, septoplasty with SMR of turbinates.  Patient tolerated general anesthesia 1 year prior for lap Appy at [facility].  Last 3 sets of Vitals    Vitals - 1 value per visit 2/9/2023 2/16/2023 2/16/2023   SYSTOLIC - 120 -   DIASTOLIC - 79 -   Pulse - 88 -   Temp - 98.2 -   Resp - - -   SPO2 - 99 -   Weight (lb) 115 - 120.37   Weight (kg) 52.164 - 54.6   Height 61 - 61   BMI (Calculated) 21.7 - 22.8   VISIT REPORT - - -         Lab Results   Component Value Date    WBC 5.9 02/06/2023    HGB 13.9 02/06/2023    HCT 42.4 02/06/2023    MCV 96.4 (H) 02/06/2023     02/06/2023          BMP  Lab Results   Component Value Date     02/06/2023    K 4.0 02/06/2023    CO2 27 02/06/2023    BUN 14.2 02/06/2023    CREATININE 0.97 02/06/2023    CALCIUM 9.4 02/06/2023    EGFRNONAA >60 06/05/2020      Lab Results   Component Value Date    INR 1.00 02/06/2023    PROTIME 13.1 02/06/2023       Pre-op Assessment    I have reviewed the Patient Summary Reports.    I have reviewed the NPO Status.   I have reviewed the Medications.     Review of Systems  Anesthesia Hx:   Denies Personal Hx of Anesthesia complications.   Social:  Non-Smoker    Cardiovascular:  Functional Capacity good / => 4 METS        Physical Exam  General: Well nourished, Cooperative, Alert and Oriented    Airway:  Mallampati: II   Mouth Opening: Normal  TM Distance: Normal  Tongue: Normal  Neck ROM: Normal ROM    Dental:  Intact    Chest/Lungs:  Clear to auscultation, Normal Respiratory Rate    Heart:  Rate: Normal  Rhythm: Regular Rhythm        Anesthesia Plan  Type of Anesthesia, risks & benefits discussed:    Anesthesia Type: Gen ETT  Intra-op Monitoring Plan: Standard ASA Monitors  Post Op Pain Control Plan: multimodal analgesia and IV/PO Opioids  PRN  Induction:  IV  Airway Plan: Direct  Informed Consent: Informed consent signed with the Patient and all parties understand the risks and agree with anesthesia plan.  All questions answered.   ASA Score: 1  Day of Surgery Review of History & Physical: H&P Update referred to the surgeon/provider.    Ready For Surgery From Anesthesia Perspective.     .

## 2023-02-16 NOTE — ANESTHESIA PROCEDURE NOTES
Intubation    Date/Time: 2/16/2023 8:16 AM  Performed by: Chrissy Szymanski CRNA  Authorized by: Neil Bui Jr., MD     Intubation:     Induction:  Intravenous    Intubated:  Postinduction    Mask Ventilation:  Easy with oral airway    Attempts:  1    Attempted By:  CRNA    Method of Intubation:  Direct    Blade:  Christian 3    Laryngeal View Grade: Grade I - full view of cords      Difficult Airway Encountered?: No      Complications:  None    Airway Device:  Oral endotracheal tube    Airway Device Size:  6.5    Style/Cuff Inflation:  Cuffed (inflated to minimal occlusive pressure)    Tube secured:  21    Secured at:  The lips    Placement Verified By:  Capnometry    Complicating Factors:  None    Findings Post-Intubation:  BS equal bilateral

## 2023-02-16 NOTE — ANESTHESIA POSTPROCEDURE EVALUATION
Anesthesia Post Evaluation    Patient: Lyssa Paul    Procedure(s) Performed: Procedure(s) (LRB):  FESS, USING COMPUTER-ASSISTED NAVIGATION / Medtronic Irrigation / Excision Left Marie (N/A)  SEPTOPLASTY, NOSE (Bilateral)  EXCISION, NASAL TURBINATE, SUBMUCOSAL (Bilateral)    Final Anesthesia Type: general      Patient location during evaluation: floor  Patient participation: Yes- Able to Participate  Level of consciousness: awake and alert  Post-procedure vital signs: reviewed and stable  Pain management: adequate  Airway patency: patent    PONV status at discharge: No PONV  Anesthetic complications: no      Cardiovascular status: blood pressure returned to baseline  Respiratory status: spontaneous ventilation and room air  Hydration status: euvolemic  Follow-up not needed.          Vitals Value Taken Time   /81 02/16/23 1052   Temp 36.6 °C (97.9 °F) 02/16/23 1050   Pulse 99 02/16/23 1053   Resp 21 02/16/23 1053   SpO2 99 % 02/16/23 1053   Vitals shown include unvalidated device data.      No case tracking events are documented in the log.      Pain/Elroy Score: Pain Rating Prior to Med Admin: 7 (2/16/2023 10:32 AM)  Pain Rating Post Med Admin: 5 (2/16/2023 10:55 AM)  Elroy Score: 9 (2/16/2023 10:50 AM)

## 2023-02-17 LAB — PSYCHE PATHOLOGY RESULT: NORMAL

## 2023-02-19 VITALS
SYSTOLIC BLOOD PRESSURE: 114 MMHG | WEIGHT: 120.38 LBS | DIASTOLIC BLOOD PRESSURE: 71 MMHG | BODY MASS INDEX: 22.73 KG/M2 | TEMPERATURE: 98 F | RESPIRATION RATE: 18 BRPM | HEART RATE: 77 BPM | OXYGEN SATURATION: 94 % | HEIGHT: 61 IN

## 2023-03-01 NOTE — OP NOTE
PATIENT NAME:      JOSE A MEDINA  YOB: 1980  CSN:               [id number]  MRN:               [id number]  ADMIT DATE:        02/16/2023 06:57:00  PHYSICIAN:         Kenney Fry                          OPERATIVE REPORT      DATE OF SURGERY:    02/16/2023 00:00:00    SURGEON:  Kenney Fry    PREOPERATIVE DIAGNOSES:    1. Chronic recurrent pansinusitis.  2. Intranasal and intrasinus polyposis.  3. Nasal septal deviation.  4. Turbinate hypertrophy.    OPERATIONS PERFORMED:  Functional endoscopic sinus surgery, bilateral anterior   and posterior ethmoidectomy, maxillary antrostomy, septoplasty, turbinoplasty,   frontal sinus exploration, sphenoidotomy, submucous resection of the inferior   turbinate, maxillary antrostomy.    DESCRIPTION OF PROCEDURE:  With proper consent information, the patient was   brought to the operating room and placed on the operating table in supine   position.  With satisfactory endotracheal intubation and general anesthesia, the   patient was placed asleep.  The nose was packed with 200 mg of cocaine and   regionally anesthetized with 2 cc of 2% xylocaine with epinephrine.  The   septoplasty was done first and a left hemitransfixion was done on the left side   and the mucoperichondrium was elevated off both sides of the septum.  The   deviation was removed, leaving a caudal and dorsal strut of approximately 1 cm.    This was reapproximated with a 4-0 plain catgut and a 5-0 chromic and tissue   was then placed to the sinuses.  A right infundibulotomy was done.  The anterior   sinus was opened.  There was a large amount of polypoid material that was   removed in the frontal ethmoid area.  The ground lamella was opened.  This area   had some chronically infected polypoid material.  This was carried up to the   fovea ethmoidalis.  The frontal ethmoid recess was opened.  There was some   obstructive chronic inflammatory inspissated material in this  area.  This was   removed with upbiting cutting forceps.  The maxillary antrostomy was done in the   usual fashion.  There was obstructive polypoid material emanating from this   area as well.  The left side was done identically.  The sphenoid sinus was   opened as well.  There were obstructive changes in this area as well.  A   submucosal resection was done of the inferior turbinate.  She tolerated the   procedure very well.  The nose was packed with Merocel and she was transferred   back to the recovery room, awake, alert, responsive.        ______________________________  Kenney BLANCO/PRINCES  DD:  03/01/2023  Time:  01:49PM  DT:  03/01/2023  Time:  04:32PM  Job #:  [id number]      OPERATIVE REPORT

## 2023-03-13 DIAGNOSIS — R51.9 NONINTRACTABLE HEADACHE, UNSPECIFIED CHRONICITY PATTERN, UNSPECIFIED HEADACHE TYPE: Primary | ICD-10-CM

## 2023-03-15 ENCOUNTER — OFFICE VISIT (OUTPATIENT)
Dept: NEUROLOGY | Facility: CLINIC | Age: 43
End: 2023-03-15
Payer: COMMERCIAL

## 2023-03-15 VITALS
BODY MASS INDEX: 23.56 KG/M2 | DIASTOLIC BLOOD PRESSURE: 78 MMHG | SYSTOLIC BLOOD PRESSURE: 118 MMHG | HEIGHT: 60 IN | WEIGHT: 120 LBS

## 2023-03-15 DIAGNOSIS — R51.9 NONINTRACTABLE HEADACHE, UNSPECIFIED CHRONICITY PATTERN, UNSPECIFIED HEADACHE TYPE: ICD-10-CM

## 2023-03-15 DIAGNOSIS — G43.709 CHRONIC MIGRAINE W/O AURA W/O STATUS MIGRAINOSUS, NOT INTRACTABLE: Primary | ICD-10-CM

## 2023-03-15 PROCEDURE — 3008F PR BODY MASS INDEX (BMI) DOCUMENTED: ICD-10-PCS | Mod: CPTII,S$GLB,, | Performed by: PSYCHIATRY & NEUROLOGY

## 2023-03-15 PROCEDURE — 99204 OFFICE O/P NEW MOD 45 MIN: CPT | Mod: S$GLB,,, | Performed by: PSYCHIATRY & NEUROLOGY

## 2023-03-15 PROCEDURE — 99999 PR PBB SHADOW E&M-EST. PATIENT-LVL III: CPT | Mod: PBBFAC,,, | Performed by: PSYCHIATRY & NEUROLOGY

## 2023-03-15 PROCEDURE — 1159F MED LIST DOCD IN RCRD: CPT | Mod: CPTII,S$GLB,, | Performed by: PSYCHIATRY & NEUROLOGY

## 2023-03-15 PROCEDURE — 99204 PR OFFICE/OUTPT VISIT, NEW, LEVL IV, 45-59 MIN: ICD-10-PCS | Mod: S$GLB,,, | Performed by: PSYCHIATRY & NEUROLOGY

## 2023-03-15 PROCEDURE — 3074F PR MOST RECENT SYSTOLIC BLOOD PRESSURE < 130 MM HG: ICD-10-PCS | Mod: CPTII,S$GLB,, | Performed by: PSYCHIATRY & NEUROLOGY

## 2023-03-15 PROCEDURE — 3078F DIAST BP <80 MM HG: CPT | Mod: CPTII,S$GLB,, | Performed by: PSYCHIATRY & NEUROLOGY

## 2023-03-15 PROCEDURE — 3074F SYST BP LT 130 MM HG: CPT | Mod: CPTII,S$GLB,, | Performed by: PSYCHIATRY & NEUROLOGY

## 2023-03-15 PROCEDURE — 3008F BODY MASS INDEX DOCD: CPT | Mod: CPTII,S$GLB,, | Performed by: PSYCHIATRY & NEUROLOGY

## 2023-03-15 PROCEDURE — 99999 PR PBB SHADOW E&M-EST. PATIENT-LVL III: ICD-10-PCS | Mod: PBBFAC,,, | Performed by: PSYCHIATRY & NEUROLOGY

## 2023-03-15 PROCEDURE — 1159F PR MEDICATION LIST DOCUMENTED IN MEDICAL RECORD: ICD-10-PCS | Mod: CPTII,S$GLB,, | Performed by: PSYCHIATRY & NEUROLOGY

## 2023-03-15 PROCEDURE — 3078F PR MOST RECENT DIASTOLIC BLOOD PRESSURE < 80 MM HG: ICD-10-PCS | Mod: CPTII,S$GLB,, | Performed by: PSYCHIATRY & NEUROLOGY

## 2023-03-15 RX ORDER — ACETAMINOPHEN 500 MG
5000 TABLET ORAL
COMMUNITY

## 2023-03-15 RX ORDER — FLUTICASONE PROPIONATE 50 MCG
SPRAY, SUSPENSION (ML) NASAL
COMMUNITY
Start: 2023-02-22 | End: 2023-12-05

## 2023-03-15 RX ORDER — BUTALBITAL, ACETAMINOPHEN AND CAFFEINE 300; 40; 50 MG/1; MG/1; MG/1
1-2 CAPSULE ORAL EVERY 4 HOURS PRN
COMMUNITY
Start: 2023-03-09 | End: 2023-03-23

## 2023-03-15 RX ORDER — KETOROLAC TROMETHAMINE 10 MG/1
10 TABLET, FILM COATED ORAL EVERY 6 HOURS PRN
COMMUNITY
Start: 2023-03-06 | End: 2023-03-23

## 2023-03-15 RX ORDER — MULTIVITAMIN
1 TABLET ORAL DAILY
COMMUNITY

## 2023-03-15 NOTE — PROGRESS NOTES
Chief Complaint   Patient presents with    Headache     New patient referred by Dr Kenney Fry for nonintractable headaches. Started Feb 24, 2023. She states that she only gets them after allergy injections and they last about 2-3 days.         This is a 42 y.o. female  here for headaches. Started Feb 24, 2023. She states that she only gets them after allergy injections and they last about 2-3 days.  She had migraines previously but she is not had those for several years.  They were throbbing severe headaches that were worse with routine activity.  She was tried on Topamax and Triptan for a while which were not helpful.    Now has severe headaches which started 2 weeks ago. She is 3 weeks s/p sinus surgery. Thinks Has triggered by allergy shots, has taken toradol which was not effective.  Has taken Phenergan in the past which is also not effective.    Notably she has a history of hypersomnia and is on a evekeo and auvelity.     Medication List with Changes/Refills   Current Medications    AMPHETAMINE SULFATE (EVEKEO) 10 MG TAB    Take 10 mg by mouth 2 (two) times a day.    BUTALBITAL-ACETAMINOPHEN-CAFF -40 MG CAP    Take 1-2 capsules by mouth every 4 (four) hours as needed.    CHOLECALCIFEROL, VITAMIN D3, 125 MCG (5,000 UNIT) TAB    Take 5,000 Units by mouth.    DEXTROMETHORPHAN HBR/BUPROPION (AUVELITY ORAL)    Take by mouth.    FLUTICASONE PROPIONATE (FLONASE) 50 MCG/ACTUATION NASAL SPRAY    by Each Nostril route.    KETOROLAC (TORADOL) 10 MG TABLET    Take 10 mg by mouth every 6 (six) hours as needed.    MULTIVITAMIN (THERAGRAN) PER TABLET    Take 1 tablet by mouth once daily.   Discontinued Medications    BUPROPION (WELLBUTRIN XL) 300 MG 24 HR TABLET    Take 300 mg by mouth once daily.    CITALOPRAM (CELEXA) 10 MG TABLET    Take 10 mg by mouth once daily.    DEXTROAMPHETAMINE-AMPHETAMINE (ADDERALL) 20 MG TABLET    Take 1 tablet by mouth 2 (two) times daily.        Past Surgical History:   Procedure  Laterality Date    ABDOMINAL SURGERY      for endometriosis    APPENDECTOMY      EXCISION, NASAL TURBINATE, SUBMUCOSAL Bilateral 2/16/2023    Procedure: EXCISION, NASAL TURBINATE, SUBMUCOSAL;  Surgeon: Kenney Fry MD;  Location: [facility] OR;  Service: ENT;  Laterality: Bilateral;    FUNCTIONAL ENDOSCOPIC SINUS SURGERY (FESS) N/A 2/16/2023    Procedure: FESS, USING COMPUTER-ASSISTED NAVIGATION / Medtronic Irrigation / Excision Left Marie;  Surgeon: Kenney Fry MD;  Location: [facility] OR;  Service: ENT;  Laterality: N/A;  did not need aleksandra    NASAL SEPTOPLASTY Bilateral 2/16/2023    Procedure: SEPTOPLASTY, NOSE;  Surgeon: Kenney Fry MD;  Location: [facility] OR;  Service: ENT;  Laterality: Bilateral;    SINUS SURGERY          Past Medical History:   Diagnosis Date    Chronic sinusitis, unspecified     Depression     Headache         Family History   Problem Relation Age of Onset    No Known Problems Mother     No Known Problems Father     No Known Problems Sister     No Known Problems Brother         Social History     Socioeconomic History    Marital status:    Tobacco Use    Smoking status: Never    Smokeless tobacco: Never   Substance and Sexual Activity    Alcohol use: Yes     Alcohol/week: 4.0 standard drinks     Types: 4 Glasses of wine per week     Comment: occasionally    Drug use: Never    Sexual activity: Yes     Partners: Male          Review of Systems  Review of Systems   Constitutional: Negative for appetite change.   HENT: Negative for sinus pressure and sore throat.    Eyes: Negative for visual disturbance.   Respiratory: Negative for cough and shortness of breath.    Cardiovascular: Negative for chest pain.   Gastrointestinal: Negative for diarrhea and nausea.   Endocrine: Negative for cold intolerance and heat intolerance.   Genitourinary: Negative for dysuria.   Musculoskeletal: Negative for arthralgias and myalgias.   Skin: Negative for rash.   Allergic/Immunologic: Negative for  immunocompromised state.   Neurological:        See HPI   Hematological: Does not bruise/bleed easily.   Psychiatric/Behavioral: Negative for hallucinations.      General: alert and oriented, no acute distress, no audible wheezes, pulse intact, no edema    Vitals:    03/15/23 1026   BP: 118/78        Cognition and Comprehension  Speech and language intact  Follows commands  Speech fluent  Attention intact  Memory for recent events intact from history taking  Affect pleasant  Fund of knowledge adequate    Cranial nerves  II. Optic: Visual fields full to confrontation both eyes  III, IV, VI. Oculomotor: Intact, Pupils equal, round and reactive to light, no nystagmus  V. Trigeminal: sensation to light touch normal  VII. No facial asymmetry or facial weakness  VIII. Hearing intact to spoken voice  IX/X. Glossopharyngeal/Vagus: Voice normal, palate rises symmetrically  XI. Axillary: Shoulder shrug normal  XII. Hypoglossal: Intact    Muscle Strength and Tone  Normal upper extremity tone  Normal lower extremity tone  Normal upper extremity strength  Normal lower extremity strength        Coordination and Gait  Finger to nose normal  Gait normal       Lyssa was seen today for headache.    Diagnoses and all orders for this visit:    Nonintractable headache, unspecified chronicity pattern, unspecified headache type  -     Ambulatory referral/consult to Neurology     Will try migraine cocktail today. Will call tomorrow if not helpful, consider Reyvow. Discussed pred taper, does not like the way it makes her feel

## 2023-03-16 ENCOUNTER — TELEPHONE (OUTPATIENT)
Dept: NEUROLOGY | Facility: CLINIC | Age: 43
End: 2023-03-16
Payer: COMMERCIAL

## 2023-03-16 DIAGNOSIS — R51.9 NONINTRACTABLE HEADACHE, UNSPECIFIED CHRONICITY PATTERN, UNSPECIFIED HEADACHE TYPE: ICD-10-CM

## 2023-03-16 DIAGNOSIS — G43.709 CHRONIC MIGRAINE W/O AURA W/O STATUS MIGRAINOSUS, NOT INTRACTABLE: Primary | ICD-10-CM

## 2023-03-16 RX ORDER — LASMIDITAN 50 MG/1
1 TABLET ORAL ONCE AS NEEDED
Qty: 5 TABLET | Refills: 1 | Status: SHIPPED | OUTPATIENT
Start: 2023-03-16 | End: 2023-03-16

## 2023-03-16 RX ORDER — PREDNISONE 10 MG/1
TABLET ORAL
Qty: 21 TABLET | Refills: 0 | Status: SHIPPED | OUTPATIENT
Start: 2023-03-16 | End: 2023-03-23

## 2023-03-16 NOTE — TELEPHONE ENCOUNTER
Patient called reporting she did a Headache infusion on yesterday. States the infusion didn't give her any relief and made her migraine worst. States she currently have a migraine on her left side behind her eye and cheek. Denies any nausea or vomiting. States she having perceptual vision between her left and right eye. Sensitivity to smell, but denies any to light or noise. Pain level is 4/10. Also, states she haves a lot of swelling throughout her whole body. Patient admitted not taking any medication today for migraines. Requesting a call back to further discuss. Please advise.

## 2023-03-16 NOTE — TELEPHONE ENCOUNTER
Pt called stating pharmacy is out of Rayvow has also called a few other pharmacy's they are also unable to get medication. Pt requesting other options okay with possible prednisone treatment.       Advised by Ligia Swenson, FNP move forward with prednisone taper and pursue getting reyvow

## 2023-03-22 NOTE — DISCHARGE SUMMARY
Patient was discharged home in stable condition. The patient was given follow up instructions to see me in office.

## 2023-03-23 ENCOUNTER — HOSPITAL ENCOUNTER (OUTPATIENT)
Facility: HOSPITAL | Age: 43
Discharge: HOME | End: 2023-03-24
Attending: OTOLARYNGOLOGY | Admitting: OTOLARYNGOLOGY
Payer: COMMERCIAL

## 2023-03-23 ENCOUNTER — ANESTHESIA EVENT (OUTPATIENT)
Dept: SURGERY | Facility: HOSPITAL | Age: 43
End: 2023-03-23
Payer: COMMERCIAL

## 2023-03-23 DIAGNOSIS — J32.9 CHRONIC SINUSITIS, UNSPECIFIED LOCATION: ICD-10-CM

## 2023-03-23 DIAGNOSIS — J32.9 SINUS ABSCESS: ICD-10-CM

## 2023-03-23 LAB
B-HCG UR QL: NEGATIVE
CTP QC/QA: YES

## 2023-03-23 PROCEDURE — 63600175 PHARM REV CODE 636 W HCPCS: Performed by: OTOLARYNGOLOGY

## 2023-03-23 PROCEDURE — 81025 URINE PREGNANCY TEST: CPT | Performed by: OTOLARYNGOLOGY

## 2023-03-23 PROCEDURE — 25000003 PHARM REV CODE 250: Performed by: OTOLARYNGOLOGY

## 2023-03-23 RX ORDER — SODIUM CHLORIDE, SODIUM LACTATE, POTASSIUM CHLORIDE, CALCIUM CHLORIDE 600; 310; 30; 20 MG/100ML; MG/100ML; MG/100ML; MG/100ML
INJECTION, SOLUTION INTRAVENOUS CONTINUOUS
Status: DISCONTINUED | OUTPATIENT
Start: 2023-03-23 | End: 2023-03-24 | Stop reason: HOSPADM

## 2023-03-23 RX ORDER — CEFTRIAXONE 2 G/50ML
2 INJECTION, SOLUTION INTRAVENOUS
Status: DISCONTINUED | OUTPATIENT
Start: 2023-03-23 | End: 2023-03-24 | Stop reason: HOSPADM

## 2023-03-23 RX ORDER — HYDROCODONE BITARTRATE AND ACETAMINOPHEN 7.5; 325 MG/1; MG/1
1 TABLET ORAL EVERY 4 HOURS PRN
Status: DISCONTINUED | OUTPATIENT
Start: 2023-03-23 | End: 2023-03-24 | Stop reason: HOSPADM

## 2023-03-23 RX ORDER — CEFTRIAXONE 1 G/1
INJECTION, POWDER, FOR SOLUTION INTRAMUSCULAR; INTRAVENOUS
Status: DISPENSED
Start: 2023-03-23 | End: 2023-03-24

## 2023-03-23 RX ADMIN — CEFTRIAXONE 2 G: 2 INJECTION, SOLUTION INTRAVENOUS at 08:03

## 2023-03-23 RX ADMIN — HYDROCODONE BITARTRATE AND ACETAMINOPHEN 1 TABLET: 7.5; 325 TABLET ORAL at 10:03

## 2023-03-24 ENCOUNTER — ANESTHESIA (OUTPATIENT)
Dept: SURGERY | Facility: HOSPITAL | Age: 43
End: 2023-03-24
Payer: COMMERCIAL

## 2023-03-24 PROBLEM — J01.10: Status: ACTIVE | Noted: 2023-03-24

## 2023-03-24 PROCEDURE — 37000008 HC ANESTHESIA 1ST 15 MINUTES: Performed by: OTOLARYNGOLOGY

## 2023-03-24 PROCEDURE — 87206 SMEAR FLUORESCENT/ACID STAI: CPT | Mod: 91,90 | Performed by: OTOLARYNGOLOGY

## 2023-03-24 PROCEDURE — C2625 STENT, NON-COR, TEM W/DEL SY: HCPCS | Performed by: OTOLARYNGOLOGY

## 2023-03-24 PROCEDURE — 63600175 PHARM REV CODE 636 W HCPCS: Performed by: OTOLARYNGOLOGY

## 2023-03-24 PROCEDURE — 25000003 PHARM REV CODE 250: Performed by: OTOLARYNGOLOGY

## 2023-03-24 PROCEDURE — 71000016 HC POSTOP RECOV ADDL HR: Performed by: OTOLARYNGOLOGY

## 2023-03-24 PROCEDURE — 71000033 HC RECOVERY, INTIAL HOUR: Performed by: OTOLARYNGOLOGY

## 2023-03-24 PROCEDURE — 87102 FUNGUS ISOLATION CULTURE: CPT | Mod: 91 | Performed by: OTOLARYNGOLOGY

## 2023-03-24 PROCEDURE — 36000708 HC OR TIME LEV III 1ST 15 MIN: Performed by: OTOLARYNGOLOGY

## 2023-03-24 PROCEDURE — 87206 SMEAR FLUORESCENT/ACID STAI: CPT | Performed by: OTOLARYNGOLOGY

## 2023-03-24 PROCEDURE — 36000709 HC OR TIME LEV III EA ADD 15 MIN: Performed by: OTOLARYNGOLOGY

## 2023-03-24 PROCEDURE — 71000015 HC POSTOP RECOV 1ST HR: Performed by: OTOLARYNGOLOGY

## 2023-03-24 PROCEDURE — 25000003 PHARM REV CODE 250

## 2023-03-24 PROCEDURE — 37000009 HC ANESTHESIA EA ADD 15 MINS: Performed by: OTOLARYNGOLOGY

## 2023-03-24 PROCEDURE — 63600175 PHARM REV CODE 636 W HCPCS

## 2023-03-24 PROCEDURE — 71000039 HC RECOVERY, EACH ADD'L HOUR: Performed by: OTOLARYNGOLOGY

## 2023-03-24 PROCEDURE — 87070 CULTURE OTHR SPECIMN AEROBIC: CPT | Performed by: OTOLARYNGOLOGY

## 2023-03-24 PROCEDURE — C9046 COCAINE HCL NASAL SOLUTION: HCPCS | Mod: TB | Performed by: OTOLARYNGOLOGY

## 2023-03-24 PROCEDURE — 27201423 OPTIME MED/SURG SUP & DEVICES STERILE SUPPLY: Performed by: OTOLARYNGOLOGY

## 2023-03-24 PROCEDURE — 87075 CULTR BACTERIA EXCEPT BLOOD: CPT | Mod: 91 | Performed by: OTOLARYNGOLOGY

## 2023-03-24 PROCEDURE — 63600175 PHARM REV CODE 636 W HCPCS: Performed by: ANESTHESIOLOGY

## 2023-03-24 PROCEDURE — 87116 MYCOBACTERIA CULTURE: CPT | Mod: 91,90 | Performed by: OTOLARYNGOLOGY

## 2023-03-24 DEVICE — IMPLANT PROPEL MINI MOMETASONE: Type: IMPLANTABLE DEVICE | Site: NOSE | Status: FUNCTIONAL

## 2023-03-24 RX ORDER — LIDOCAINE HYDROCHLORIDE 10 MG/ML
1 INJECTION, SOLUTION EPIDURAL; INFILTRATION; INTRACAUDAL; PERINEURAL ONCE
Status: CANCELLED | OUTPATIENT
Start: 2023-03-24 | End: 2023-03-24

## 2023-03-24 RX ORDER — GLYCOPYRROLATE 0.2 MG/ML
INJECTION INTRAMUSCULAR; INTRAVENOUS
Status: DISCONTINUED | OUTPATIENT
Start: 2023-03-24 | End: 2023-03-24

## 2023-03-24 RX ORDER — ROCURONIUM BROMIDE 10 MG/ML
INJECTION, SOLUTION INTRAVENOUS
Status: DISCONTINUED | OUTPATIENT
Start: 2023-03-24 | End: 2023-03-24

## 2023-03-24 RX ORDER — ONDANSETRON HYDROCHLORIDE 2 MG/ML
INJECTION, SOLUTION INTRAMUSCULAR; INTRAVENOUS
Status: DISCONTINUED | OUTPATIENT
Start: 2023-03-24 | End: 2023-03-24

## 2023-03-24 RX ORDER — COCAINE HYDROCHLORIDE 40 MG/ML
SOLUTION NASAL
Status: DISCONTINUED | OUTPATIENT
Start: 2023-03-24 | End: 2023-03-24 | Stop reason: HOSPADM

## 2023-03-24 RX ORDER — ONDANSETRON 2 MG/ML
4 INJECTION INTRAMUSCULAR; INTRAVENOUS DAILY PRN
Status: DISCONTINUED | OUTPATIENT
Start: 2023-03-24 | End: 2023-03-24 | Stop reason: HOSPADM

## 2023-03-24 RX ORDER — LIDOCAINE HYDROCHLORIDE 10 MG/ML
INJECTION, SOLUTION EPIDURAL; INFILTRATION; INTRACAUDAL; PERINEURAL
Status: DISCONTINUED | OUTPATIENT
Start: 2023-03-24 | End: 2023-03-24

## 2023-03-24 RX ORDER — HYDROMORPHONE HYDROCHLORIDE 2 MG/ML
0.2 INJECTION, SOLUTION INTRAMUSCULAR; INTRAVENOUS; SUBCUTANEOUS EVERY 5 MIN PRN
Status: DISCONTINUED | OUTPATIENT
Start: 2023-03-24 | End: 2023-03-24 | Stop reason: HOSPADM

## 2023-03-24 RX ORDER — PROPOFOL 10 MG/ML
VIAL (ML) INTRAVENOUS
Status: DISCONTINUED | OUTPATIENT
Start: 2023-03-24 | End: 2023-03-24

## 2023-03-24 RX ORDER — COCAINE HYDROCHLORIDE 40 MG/ML
SOLUTION NASAL
Status: DISCONTINUED
Start: 2023-03-24 | End: 2023-03-24 | Stop reason: HOSPADM

## 2023-03-24 RX ORDER — ACETAMINOPHEN 10 MG/ML
1000 INJECTION, SOLUTION INTRAVENOUS ONCE
Status: DISCONTINUED | OUTPATIENT
Start: 2023-03-24 | End: 2023-03-24 | Stop reason: HOSPADM

## 2023-03-24 RX ORDER — DEXAMETHASONE SODIUM PHOSPHATE 4 MG/ML
INJECTION, SOLUTION INTRA-ARTICULAR; INTRALESIONAL; INTRAMUSCULAR; INTRAVENOUS; SOFT TISSUE
Status: DISCONTINUED | OUTPATIENT
Start: 2023-03-24 | End: 2023-03-24

## 2023-03-24 RX ORDER — CEFTRIAXONE 1 G/1
INJECTION, POWDER, FOR SOLUTION INTRAMUSCULAR; INTRAVENOUS
Status: COMPLETED
Start: 2023-03-24 | End: 2023-03-24

## 2023-03-24 RX ORDER — LIDOCAINE HYDROCHLORIDE AND EPINEPHRINE 20; 10 MG/ML; UG/ML
INJECTION, SOLUTION INFILTRATION; PERINEURAL
Status: DISCONTINUED | OUTPATIENT
Start: 2023-03-24 | End: 2023-03-24 | Stop reason: HOSPADM

## 2023-03-24 RX ORDER — LIDOCAINE HYDROCHLORIDE AND EPINEPHRINE 10; 10 MG/ML; UG/ML
INJECTION, SOLUTION INFILTRATION; PERINEURAL
Status: DISCONTINUED
Start: 2023-03-24 | End: 2023-03-24 | Stop reason: HOSPADM

## 2023-03-24 RX ORDER — FENTANYL CITRATE 50 UG/ML
INJECTION, SOLUTION INTRAMUSCULAR; INTRAVENOUS
Status: DISCONTINUED | OUTPATIENT
Start: 2023-03-24 | End: 2023-03-24

## 2023-03-24 RX ORDER — METHOCARBAMOL 100 MG/ML
INJECTION, SOLUTION INTRAMUSCULAR; INTRAVENOUS
Status: DISCONTINUED
Start: 2023-03-24 | End: 2023-03-24 | Stop reason: HOSPADM

## 2023-03-24 RX ORDER — SODIUM CHLORIDE, SODIUM GLUCONATE, SODIUM ACETATE, POTASSIUM CHLORIDE AND MAGNESIUM CHLORIDE 30; 37; 368; 526; 502 MG/100ML; MG/100ML; MG/100ML; MG/100ML; MG/100ML
INJECTION, SOLUTION INTRAVENOUS CONTINUOUS
Status: CANCELLED | OUTPATIENT
Start: 2023-03-24 | End: 2023-04-23

## 2023-03-24 RX ORDER — DIPHENHYDRAMINE HYDROCHLORIDE 50 MG/ML
25 INJECTION INTRAMUSCULAR; INTRAVENOUS EVERY 6 HOURS PRN
Status: DISCONTINUED | OUTPATIENT
Start: 2023-03-24 | End: 2023-03-24 | Stop reason: HOSPADM

## 2023-03-24 RX ORDER — DEXMEDETOMIDINE HYDROCHLORIDE 100 UG/ML
INJECTION, SOLUTION INTRAVENOUS
Status: DISCONTINUED | OUTPATIENT
Start: 2023-03-24 | End: 2023-03-24

## 2023-03-24 RX ORDER — HYDROMORPHONE HYDROCHLORIDE 2 MG/ML
0.4 INJECTION, SOLUTION INTRAMUSCULAR; INTRAVENOUS; SUBCUTANEOUS EVERY 5 MIN PRN
Status: DISCONTINUED | OUTPATIENT
Start: 2023-03-24 | End: 2023-03-24 | Stop reason: HOSPADM

## 2023-03-24 RX ORDER — MIDAZOLAM HYDROCHLORIDE 1 MG/ML
2 INJECTION INTRAMUSCULAR; INTRAVENOUS ONCE AS NEEDED
Status: COMPLETED | OUTPATIENT
Start: 2023-03-24 | End: 2023-03-24

## 2023-03-24 RX ORDER — MIDAZOLAM HYDROCHLORIDE 1 MG/ML
INJECTION INTRAMUSCULAR; INTRAVENOUS
Status: COMPLETED
Start: 2023-03-24 | End: 2023-03-24

## 2023-03-24 RX ORDER — METHOCARBAMOL 100 MG/ML
1000 INJECTION, SOLUTION INTRAMUSCULAR; INTRAVENOUS ONCE
Status: COMPLETED | OUTPATIENT
Start: 2023-03-24 | End: 2023-03-24

## 2023-03-24 RX ADMIN — MIDAZOLAM HYDROCHLORIDE 2 MG: 1 INJECTION INTRAMUSCULAR; INTRAVENOUS at 10:03

## 2023-03-24 RX ADMIN — SODIUM CHLORIDE, POTASSIUM CHLORIDE, SODIUM LACTATE AND CALCIUM CHLORIDE: 600; 310; 30; 20 INJECTION, SOLUTION INTRAVENOUS at 11:03

## 2023-03-24 RX ADMIN — HYDROMORPHONE HYDROCHLORIDE 0.2 MG: 2 INJECTION, SOLUTION INTRAMUSCULAR; INTRAVENOUS; SUBCUTANEOUS at 12:03

## 2023-03-24 RX ADMIN — HYDROMORPHONE HYDROCHLORIDE 0.4 MG: 2 INJECTION, SOLUTION INTRAMUSCULAR; INTRAVENOUS; SUBCUTANEOUS at 12:03

## 2023-03-24 RX ADMIN — MIDAZOLAM HYDROCHLORIDE 2 MG: 1 INJECTION, SOLUTION INTRAMUSCULAR; INTRAVENOUS at 10:03

## 2023-03-24 RX ADMIN — FENTANYL CITRATE 50 MCG: 50 INJECTION, SOLUTION INTRAMUSCULAR; INTRAVENOUS at 11:03

## 2023-03-24 RX ADMIN — HYDROMORPHONE HYDROCHLORIDE 0.4 MG: 2 INJECTION, SOLUTION INTRAMUSCULAR; INTRAVENOUS; SUBCUTANEOUS at 01:03

## 2023-03-24 RX ADMIN — ROCURONIUM BROMIDE 40 MG: 50 INJECTION INTRAVENOUS at 11:03

## 2023-03-24 RX ADMIN — DEXAMETHASONE SODIUM PHOSPHATE 12 MG: 4 INJECTION, SOLUTION INTRA-ARTICULAR; INTRALESIONAL; INTRAMUSCULAR; INTRAVENOUS; SOFT TISSUE at 11:03

## 2023-03-24 RX ADMIN — METHOCARBAMOL 1000 MG: 100 INJECTION, SOLUTION INTRAMUSCULAR; INTRAVENOUS at 12:03

## 2023-03-24 RX ADMIN — HYDROCODONE BITARTRATE AND ACETAMINOPHEN 1 TABLET: 7.5; 325 TABLET ORAL at 07:03

## 2023-03-24 RX ADMIN — GLYCOPYRROLATE 0.2 MG: 0.2 INJECTION INTRAMUSCULAR; INTRAVENOUS at 11:03

## 2023-03-24 RX ADMIN — FENTANYL CITRATE 50 MCG: 50 INJECTION, SOLUTION INTRAMUSCULAR; INTRAVENOUS at 12:03

## 2023-03-24 RX ADMIN — CEFTRIAXONE SODIUM 1000 MG: 1 INJECTION, POWDER, FOR SOLUTION INTRAMUSCULAR; INTRAVENOUS at 07:03

## 2023-03-24 RX ADMIN — PROPOFOL 150 MG: 10 INJECTION, EMULSION INTRAVENOUS at 11:03

## 2023-03-24 RX ADMIN — SUGAMMADEX 200 MG: 100 INJECTION, SOLUTION INTRAVENOUS at 11:03

## 2023-03-24 RX ADMIN — SODIUM CHLORIDE, POTASSIUM CHLORIDE, SODIUM LACTATE AND CALCIUM CHLORIDE: 600; 310; 30; 20 INJECTION, SOLUTION INTRAVENOUS at 04:03

## 2023-03-24 RX ADMIN — LIDOCAINE HYDROCHLORIDE 50 MG: 10 INJECTION, SOLUTION EPIDURAL; INFILTRATION; INTRACAUDAL; PERINEURAL at 11:03

## 2023-03-24 RX ADMIN — ONDANSETRON 4 MG: 2 INJECTION INTRAMUSCULAR; INTRAVENOUS at 11:03

## 2023-03-24 RX ADMIN — DEXMEDETOMIDINE 6 MCG: 200 INJECTION, SOLUTION INTRAVENOUS at 12:03

## 2023-03-24 NOTE — PLAN OF CARE
Phone call to , pt requesting prescription for pain medication, will send to pharmacy, patient notified, agree with plan of care

## 2023-03-24 NOTE — ANESTHESIA PREPROCEDURE EVALUATION
03/24/2023  Lyssa Paul is a 42 y.o., female who is s/p recent outpatient endoscopic sinus surgery, septoplasty with SMR of turbinates.  Patient tolerated general anesthesia without issues at that time.  Unfortunately she has had a headache since this prior sinus procedure.  Returning to OR today for revision FESS.        Pre-op Assessment    I have reviewed the Patient Summary Reports.     I have reviewed the Nursing Notes. I have reviewed the NPO Status.   I have reviewed the Medications.     Review of Systems  Anesthesia Hx:   Denies Personal Hx of Anesthesia complications.   Social:  Non-Smoker    Cardiovascular:  Functional Capacity good / => 4 METS        Physical Exam  General: Well nourished, Cooperative, Alert and Oriented    Airway:  Mallampati: II   Mouth Opening: Normal  TM Distance: Normal  Tongue: Normal  Neck ROM: Normal ROM    Dental:  Intact    Chest/Lungs:  Clear to auscultation, Normal Respiratory Rate    Heart:  Rate: Normal  Rhythm: Regular Rhythm        Anesthesia Plan  Type of Anesthesia, risks & benefits discussed:    Anesthesia Type: Gen ETT  Intra-op Monitoring Plan: Standard ASA Monitors  Post Op Pain Control Plan: multimodal analgesia and IV/PO Opioids PRN  Induction:  IV  Airway Plan: Direct  Informed Consent: Informed consent signed with the Patient and all parties understand the risks and agree with anesthesia plan.  All questions answered.   ASA Score: 1  Day of Surgery Review of History & Physical: H&P Update referred to the surgeon/provider.    Ready For Surgery From Anesthesia Perspective.     .

## 2023-03-24 NOTE — TRANSFER OF CARE
Anesthesia Transfer of Care Note    Patient: Lyssa Paul    Procedure(s) Performed: Procedure(s) (LRB):  FESS (FUNCTIONAL ENDOSCOPIC SINUS SURGERY) revision  Right frontal sinus with medtronic irrigation (Right)    Patient location: PACU    Anesthesia Type: general    Transport from OR: Transported from OR on room air with adequate spontaneous ventilation    Post pain: adequate analgesia    Post assessment: no apparent anesthetic complications and tolerated procedure well    Post vital signs: stable    Level of consciousness: responds to stimulation    Nausea/Vomiting: no nausea/vomiting    Complications: none    Transfer of care protocol was followed      Last vitals:   Visit Vitals  /68 (BP Location: Right arm, Patient Position: Lying)   Pulse 77   Temp 37.1 °C (98.8 °F) (Oral)   Resp 18   Ht 5' (1.524 m)   Wt 54.4 kg (120 lb)   LMP 03/17/2023 (Approximate)   SpO2 97%   Breastfeeding No   BMI 23.44 kg/m²

## 2023-03-24 NOTE — PLAN OF CARE
Discharge instructions reviewed with patient and spouse, verbalized understanding. Dressing clean/dry/intact. C/o burning to left nostril. Discharged in stable condition via wheelchair

## 2023-03-24 NOTE — DISCHARGE INSTRUCTIONS
Endoscopic Sinus Surgery Discharge Instructions     About this topic   Endoscopic sinus surgery can correct problems with your sinuses when drugs do not help. The sinuses are air-filled spaces inside the head that lie behind your forehead, nose, cheeks, and eyes. The spaces have small hairs that clean the sinuses. Your sinuses may swell, get inflamed or infected if the small hairs are not working well. You can also have problems if there is a block in the opening to the sinuses. The mucus sometimes gets trapped inside the sinus, causing pain.     What care is needed at home?   Do not blow your nose or sneeze for 7 to 10 days. If you must sneeze, keep your mouth open.  Change your dressing as needed for the next few days. Once the bleeding stops then you will not need a dressing anymore.   Breathe through your mouth for the first few days.  Place an ice pack wrapped in a towel over the painful part. Never put ice right on the skin. Do not leave the ice on more than 10 to 15 minutes at a time. This will help relieve pain and swelling.  Protect your nose from injury.  Avoid activities that put pressure on your face, like bending over or holding your breath.  You can take a bath or shower but make sure it's not too hot.   Ask the doctor when you should start to use a saline rinse for your nose. This may be right after any packing has been removed. You may need to use the rinse 3 to 4 times each day to help prevent crusts from forming inside of your nose.  You can use a humidifier.  Ask the doctor when you can return to your normal activities or return to work.    What follow-up care is needed?   Keep your scheduled follow up appointment.   Your doctor may remove any packing or stents (small tubes) at one of these visits. Follow the instructions your doctor told you.    Will physical activity be limited?   Talk with your doctor about the right amount of activity for you.  Do not lift anything over 10 pounds (4.5 kg) for  2 to 4 weeks.  Do not bend over toward the floor.  Avoid activities that may jerk your head, like playing sports.    What problems could happen?   Infection  Bleeding  Numbness in the nose  Eye problems  Bruising around eyes  Cerebrospinal fluid leaks from nose    When do I need to call the doctor?   Signs of infection. These include a fever of 100.4°F (38°C) or higher, chills.  Lots of bleeding from your nose  Packing comes out before it should  Upset stomach and throwing up not helped with drugs  Too much pain or headache  Swelling or redness of the eyes or nose  Clear fluid draining from one side of your nose  Cough, shortness of breath, chest pain  You are not feeling better in 2 to 3 days or you are feeling worse

## 2023-03-24 NOTE — ANESTHESIA PROCEDURE NOTES
Intubation    Date/Time: 3/24/2023 11:14 AM  Performed by: Jamee Larson CRNA  Authorized by: Cristo Das MD     Intubation:     Induction:  Intravenous    Intubated:  Postinduction    Mask Ventilation:  Easy mask    Attempts:  1    Attempted By:  CRNA    Method of Intubation:  Direct    Blade:  Gonzalez 2    Laryngeal View Grade: Grade I - full view of cords      Difficult Airway Encountered?: No      Complications:  None    Airway Device:  Oral endotracheal tube    Airway Device Size:  6.5    Style/Cuff Inflation:  Cuffed (inflated to minimal occlusive pressure)    Inflation Amount (mL):  6    Tube secured:  22    Secured at:  The lips    Placement Verified By:  Capnometry    Complicating Factors:  None    Findings Post-Intubation:  BS equal bilateral

## 2023-03-25 LAB
M AVIUM PARATB TISS QL ZN STN: NORMAL
M AVIUM PARATB TISS QL ZN STN: NORMAL

## 2023-03-26 VITALS
RESPIRATION RATE: 17 BRPM | HEIGHT: 60 IN | OXYGEN SATURATION: 87 % | BODY MASS INDEX: 23.56 KG/M2 | SYSTOLIC BLOOD PRESSURE: 118 MMHG | DIASTOLIC BLOOD PRESSURE: 75 MMHG | TEMPERATURE: 97 F | WEIGHT: 120 LBS | HEART RATE: 83 BPM

## 2023-03-27 LAB
BACTERIA SPEC ANAEROBE CULT: NORMAL
BACTERIA SPEC ANAEROBE CULT: NORMAL
BACTERIA SPEC CULT: ABNORMAL
BACTERIA SPEC CULT: ABNORMAL
PSYCHE PATHOLOGY RESULT: NORMAL

## 2023-03-28 LAB
RHODAMINE-AURAMINE STN SPEC: NORMAL
RHODAMINE-AURAMINE STN SPEC: NORMAL

## 2023-04-05 NOTE — ANESTHESIA POSTPROCEDURE EVALUATION
Anesthesia Post Evaluation    Patient: Lyssa Paul    Procedure(s) Performed: Procedure(s) (LRB):  FESS (FUNCTIONAL ENDOSCOPIC SINUS SURGERY) revision  Right frontal sinus with medtronic irrigation (Right)    Final Anesthesia Type: general      Patient location during evaluation: PACU  Patient participation: Yes- Able to Participate  Level of consciousness: awake and alert  Post-procedure vital signs: reviewed and stable  Pain management: adequate  Airway patency: patent      Anesthetic complications: no      Cardiovascular status: blood pressure returned to baseline  Respiratory status: unassisted  Hydration status: euvolemic  Follow-up not needed.          Vitals Value Taken Time   /75 03/24/23 1344   Temp 36.3 °C (97.3 °F) 03/24/23 1206   Pulse 83 03/24/23 1354   Resp 17 03/24/23 1310   SpO2 87 % 03/24/23 1354         Event Time   Out of Recovery 03/24/2023 13:11:13         Pain/Elroy Score: No data recorded

## 2023-04-10 NOTE — OP NOTE
PATIENT NAME:      JOSE A PAUL  YOB: 1980  CSN:               [id number]  MRN:               [id number]  ADMIT DATE:        03/23/2023 16:01:00  PHYSICIAN:         Kenney Fry                          OPERATIVE REPORT      DATE OF SURGERY:    3/24/2023    SURGEON:  Kenney Fry    PREOPERATIVE DIAGNOSES:  Chronic recurrent pansinusitis, osteoneogenesis of the   bilateral frontal sinuses with acute frontal ethmoid sinusitis on both sides,   mucopyocele.    OPERATION PERFORMED:  Revision endoscopic sinus surgery, bilateral frontal sinus   exploration, removal of mucosal antral disease and chronic osteoneogenesis on   both frontal ethmoid sinuses, revision ethmoidectomy, removal of adhesions of a   posterior ethmoid sinuses as well as the maxillary sinuses.    REASON AND INDICATION FOR SURGERY:  Ms. Paul presented to me after surgery   on the sinuses that she had probably 6 weeks prior to this.  She complained of   persistent medial canthal pain, chronic purulent posterior nasal drainage.    Multiple cultures and antibiotic regimens were offered to her as well as   systemic and topical steroids.  She did not respond.  She complained of chronic   intractable medial canthal pain, retro-orbital headache, and chronic purulent   drainage.    DESCRIPTION OF PROCEDURE:  With proper consent information, the patient was   brought to the operating room, placed on the operating room table in supine   position.  With satisfactory endotracheal intubation and general anesthesia,   patient was placed asleep.  The nose was packed with 2 mg of cocaine and   regionally anesthetized with 2 cc of 2% xylocaine with epinephrine.  The 0 scope   was used to inspect the nasal sinus cavity and the patient had a large amount   of adhesions in the ethmoid sinuses and superiorly mucopurulent drainage.  This   was removed with up-biting cutting forceps .  The posterior ethmoid sinus   surgery was  done.  The posterior ethmoid sinus had a large amount of adhesions   and scar tissue in this area.  This was removed with an up-biting cutting   forceps up to the fovea ethmoidalis.  The frontal ethmoid area was opened. There   was some distinct osteoneogenesis in this area, which was removed and opened   widely with up-biting cutting forceps and upon entering the frontal sinuses, a   large amount of mucopurulent drainage from this area.  She obviously had a   mucopyocele in this area.  The area was debrided extensively with the Medtronic   irrigation.  Multiple tissue and purulent cultures were taken.  At that point, a   drug-eluting stent was put in place to the frontal ethmoid area.  Identical   procedure was carried on opposite side.  The patient tolerated the procedure   well.  There was evidence of mucopyocele on the left side as well.  Small   NasoPore drain was placed in the area, and she was transferred back to recovery   room awake, alert, responsive.        ______________________________  Kenney BLANCO/VINH  DD:  04/10/2023  Time:  12:39PM  DT:  04/10/2023  Time:  06:31PM  Job #:  [id number]      OPERATIVE REPORT

## 2023-04-13 ENCOUNTER — OFFICE VISIT (OUTPATIENT)
Dept: NEUROLOGY | Facility: CLINIC | Age: 43
End: 2023-04-13
Payer: COMMERCIAL

## 2023-04-13 VITALS
HEART RATE: 84 BPM | SYSTOLIC BLOOD PRESSURE: 118 MMHG | DIASTOLIC BLOOD PRESSURE: 82 MMHG | WEIGHT: 120 LBS | HEIGHT: 60 IN | BODY MASS INDEX: 23.56 KG/M2

## 2023-04-13 DIAGNOSIS — Z86.69 HISTORY OF MIGRAINE: ICD-10-CM

## 2023-04-13 DIAGNOSIS — R51.9 CHRONIC DAILY HEADACHE: Primary | ICD-10-CM

## 2023-04-13 DIAGNOSIS — G43.909 MIGRAINE WITHOUT STATUS MIGRAINOSUS, NOT INTRACTABLE, UNSPECIFIED MIGRAINE TYPE: ICD-10-CM

## 2023-04-13 PROCEDURE — 3074F SYST BP LT 130 MM HG: CPT | Mod: CPTII,S$GLB,, | Performed by: NURSE PRACTITIONER

## 2023-04-13 PROCEDURE — 99214 OFFICE O/P EST MOD 30 MIN: CPT | Mod: S$GLB,,, | Performed by: NURSE PRACTITIONER

## 2023-04-13 PROCEDURE — 3008F PR BODY MASS INDEX (BMI) DOCUMENTED: ICD-10-PCS | Mod: CPTII,S$GLB,, | Performed by: NURSE PRACTITIONER

## 2023-04-13 PROCEDURE — 3079F PR MOST RECENT DIASTOLIC BLOOD PRESSURE 80-89 MM HG: ICD-10-PCS | Mod: CPTII,S$GLB,, | Performed by: NURSE PRACTITIONER

## 2023-04-13 PROCEDURE — 1160F PR REVIEW ALL MEDS BY PRESCRIBER/CLIN PHARMACIST DOCUMENTED: ICD-10-PCS | Mod: CPTII,S$GLB,, | Performed by: NURSE PRACTITIONER

## 2023-04-13 PROCEDURE — 3074F PR MOST RECENT SYSTOLIC BLOOD PRESSURE < 130 MM HG: ICD-10-PCS | Mod: CPTII,S$GLB,, | Performed by: NURSE PRACTITIONER

## 2023-04-13 PROCEDURE — 3008F BODY MASS INDEX DOCD: CPT | Mod: CPTII,S$GLB,, | Performed by: NURSE PRACTITIONER

## 2023-04-13 PROCEDURE — 99999 PR PBB SHADOW E&M-EST. PATIENT-LVL III: CPT | Mod: PBBFAC,,, | Performed by: NURSE PRACTITIONER

## 2023-04-13 PROCEDURE — 99214 PR OFFICE/OUTPT VISIT, EST, LEVL IV, 30-39 MIN: ICD-10-PCS | Mod: S$GLB,,, | Performed by: NURSE PRACTITIONER

## 2023-04-13 PROCEDURE — 1160F RVW MEDS BY RX/DR IN RCRD: CPT | Mod: CPTII,S$GLB,, | Performed by: NURSE PRACTITIONER

## 2023-04-13 PROCEDURE — 1159F MED LIST DOCD IN RCRD: CPT | Mod: CPTII,S$GLB,, | Performed by: NURSE PRACTITIONER

## 2023-04-13 PROCEDURE — 3079F DIAST BP 80-89 MM HG: CPT | Mod: CPTII,S$GLB,, | Performed by: NURSE PRACTITIONER

## 2023-04-13 PROCEDURE — 1159F PR MEDICATION LIST DOCUMENTED IN MEDICAL RECORD: ICD-10-PCS | Mod: CPTII,S$GLB,, | Performed by: NURSE PRACTITIONER

## 2023-04-13 PROCEDURE — 99999 PR PBB SHADOW E&M-EST. PATIENT-LVL III: ICD-10-PCS | Mod: PBBFAC,,, | Performed by: NURSE PRACTITIONER

## 2023-04-13 RX ORDER — ATOGEPANT 60 MG/1
60 TABLET ORAL DAILY
Qty: 30 TABLET | Refills: 5 | Status: SHIPPED | OUTPATIENT
Start: 2023-04-13 | End: 2023-12-05

## 2023-04-13 NOTE — PROGRESS NOTES
Subjective:       Patient ID: Lyssa Paul is a 42 y.o. female.    Chief Complaint: Headache (On 3/24 had sinus surgery. Migraines stop, but still has sinus headache behind left eye. Headaches are daily lasting all day. Pain is a dull pressure. )      History of Present Illness:  One-month follow-up visit for daily headache.  She had repeat sinus surgery on March 24th.  Since then, the severity of her daily headaches has significantly reduced, but she is still having daily head pain.  It is localized to behind her left eye.  It is a pressure sensation like someone is blowing up a balloon.  She does have a history of migraine headache, but says this does not feel like her historical migraines.  Of note, she is taking Tylenol or Advil several times a day every day.  She did not take the reyvow that was prescribed at last visit because she was concerned about over-sedation.  She has a 12-year-old and a 9-year-old so she is very cautious about things like that.        Review of Systems  I have reviewed a 12 point review of systems which is negative unless otherwise stated in HPI        Past Medical History:   Diagnosis Date    Chronic sinusitis, unspecified     Depression     Headache        Past Surgical History:   Procedure Laterality Date    ABDOMINAL SURGERY      for endometriosis    APPENDECTOMY      EXCISION, NASAL TURBINATE, SUBMUCOSAL Bilateral 2/16/2023    Procedure: EXCISION, NASAL TURBINATE, SUBMUCOSAL;  Surgeon: Kenney Fry MD;  Location: [facility] OR;  Service: ENT;  Laterality: Bilateral;    FUNCTIONAL ENDOSCOPIC SINUS SURGERY (FESS) N/A 2/16/2023    Procedure: FESS, USING COMPUTER-ASSISTED NAVIGATION / Medtronic Irrigation / Excision Left Marie;  Surgeon: Kenney Fry MD;  Location: [facility] OR;  Service: ENT;  Laterality: N/A;  did not need aleksandra    FUNCTIONAL ENDOSCOPIC SINUS SURGERY (FESS) Right 3/24/2023    Procedure: FESS (FUNCTIONAL ENDOSCOPIC SINUS SURGERY) revision  Right frontal  sinus with medtronic irrigation;  Surgeon: Kenney Fry MD;  Location: [facility] OR;  Service: ENT;  Laterality: Right;  rev fess with propel stents    NASAL SEPTOPLASTY Bilateral 2023    Procedure: SEPTOPLASTY, NOSE;  Surgeon: Kenney Fry MD;  Location: [facility] OR;  Service: ENT;  Laterality: Bilateral;    SINUS SURGERY          Family History   Problem Relation Age of Onset    Thyroid disease Mother     No Known Problems Father     No Known Problems Sister     Thyroid disease Brother         Social History     Socioeconomic History    Marital status:    Tobacco Use    Smoking status: Never    Smokeless tobacco: Never   Substance and Sexual Activity    Alcohol use: Yes     Alcohol/week: 4.0 standard drinks     Types: 4 Glasses of wine per week     Comment: occasionally    Drug use: Never    Sexual activity: Yes     Partners: Male        Outpatient Encounter Medications as of 2023   Medication Sig Dispense Refill    amphetamine sulfate (EVEKEO) 10 mg Tab Take 10 mg by mouth 2 (two) times a day.      cholecalciferol, vitamin D3, 125 mcg (5,000 unit) Tab Take 5,000 Units by mouth.      dextromethorphan HBr/bupropion (AUVELITY ORAL) Take 1 tablet by mouth Daily.      multivitamin (THERAGRAN) per tablet Take 1 tablet by mouth once daily.      atogepant (QULIPTA) 60 mg Tab Take 60 mg by mouth once daily. 30 tablet 5    fluticasone propionate (FLONASE) 50 mcg/actuation nasal spray by Each Nostril route.      [] lasmiditan (REYVOW) tablet 50 mg Take 1 tablet by mouth once as needed (migraine). 5 tablet 1    [DISCONTINUED] butalbital-acetaminophen-caff -40 mg Cap Take 1-2 capsules by mouth every 4 (four) hours as needed.      [DISCONTINUED] ketorolac (TORADOL) 10 mg tablet Take 10 mg by mouth every 6 (six) hours as needed.      [DISCONTINUED] predniSONE (DELTASONE) 10 MG tablet Day 1: take 6 tabs Day 2: take 5 tabs Day 3: take 4 tabs Day 4: take 3 tabs Day 3: take 2 tabs Day 1: take 1  tab 21 tablet 0    [] cefTRIAXone (ROCEPHIN) 1 gram injection       [] phenylephrine HCL 0.5% nasal spray 2 spray       [DISCONTINUED] cefTRIAXone 2 gram/50 mL IVPB 2 g       [DISCONTINUED] HYDROcodone-acetaminophen 7.5-325 mg per tablet 1 tablet       [DISCONTINUED] lactated ringers infusion        No facility-administered encounter medications on file as of 2023.      Objective:   /82 (BP Location: Left arm)   Pulse 84   Ht 5' (1.524 m)   Wt 54.4 kg (120 lb)   LMP 2023 (Approximate)   BMI 23.44 kg/m²        Physical Exam  NAD  alert and oriented  cognition and perception intact  no aphasia  EOMI  no facial asymmetry  no dysarthria  moves all extremities symmetrically  no gross coordination abnormalities  gait normal       Assessment & Plan:      1. Chronic daily headache  Assessment & Plan:  -the severity of her headaches did improve after repeat sinus surgery on 2023.  Unfortunately, she continues to have daily head pain behind her left eye described as a pressure sensation.  Given her history of migraines, trial of daily Qulipta.  Once she receives Qulipta, discussed importance of reducing daily Tylenol and Advil intake as I suspect there is some component of medication overuse headache      2. 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  She tried several medications including Topamax which she took for 1-2 years, but then stopped working.  She then tried Botox for 1.5 years and then the migraines resolved independently for several years.  In 2021, she underwent a turbinate reduction.  Postoperatively, she started allergy drops and her migraines returned and were occurring on a daily basis.  These were present for 6 months and then ultimately resolved on their own when she stopped the allergy drops.  She tried Ubrelvy during that time, but it was not helpful.  She then had return of migraines once she started  allergy injections.  They would only occur immediately following the allergy shot and would last for about 2-3 days.  She could not tolerate the allergy injections so she underwent sinus surgery in February 2023.  Following the surgery, she began having severe daily headaches that were unresponsive to Toradol, Phenergan, Topamax, Botox, prednisone, migraine infusion, and migraine cocktail.      3. Migraine without status migrainosus, not intractable, unspecified migraine type  -     atogepant (QULIPTA) 60 mg Tab; Take 60 mg by mouth once daily.  Dispense: 30 tablet; Refill: 5          This note was created with the assistance of voice recognition software. There may be transcription errors as a result of using this technology however minimal. Effort has been made to assure accuracy of transcription but any obvious errors or omissions should be clarified with the author of the document.

## 2023-04-13 NOTE — ASSESSMENT & PLAN NOTE
-the severity of her headaches did improve after repeat sinus surgery on 03/24/2023.  Unfortunately, she continues to have daily head pain behind her left eye described as a pressure sensation.  Given her history of migraines, trial of daily Qulipta.  Once she receives Qulipta, discussed importance of reducing daily Tylenol and Advil intake as I suspect there is some component of medication overuse headache

## 2023-04-24 LAB
FUNGUS SPEC CULT: NORMAL
FUNGUS SPEC CULT: NORMAL

## 2023-05-09 LAB — MYCOBACTERIUM SPEC QL CULT: NORMAL

## 2023-05-10 LAB — MYCOBACTERIUM SPEC QL CULT: NORMAL

## 2023-06-26 PROBLEM — J01.10: Status: RESOLVED | Noted: 2023-03-24 | Resolved: 2023-06-26

## 2023-12-05 ENCOUNTER — OFFICE VISIT (OUTPATIENT)
Dept: URGENT CARE | Facility: CLINIC | Age: 43
End: 2023-12-05
Payer: COMMERCIAL

## 2023-12-05 VITALS
OXYGEN SATURATION: 100 % | BODY MASS INDEX: 23.56 KG/M2 | WEIGHT: 120 LBS | TEMPERATURE: 100 F | HEIGHT: 60 IN | DIASTOLIC BLOOD PRESSURE: 73 MMHG | SYSTOLIC BLOOD PRESSURE: 110 MMHG | HEART RATE: 90 BPM | RESPIRATION RATE: 16 BRPM

## 2023-12-05 DIAGNOSIS — R52 BODY ACHES: ICD-10-CM

## 2023-12-05 DIAGNOSIS — U07.1 COVID-19: Primary | ICD-10-CM

## 2023-12-05 LAB
CTP QC/QA: YES
CTP QC/QA: YES
POC MOLECULAR INFLUENZA A AGN: NEGATIVE
POC MOLECULAR INFLUENZA B AGN: NEGATIVE
SARS-COV-2 RDRP RESP QL NAA+PROBE: POSITIVE

## 2023-12-05 PROCEDURE — 87635 SARS-COV-2 COVID-19 AMP PRB: CPT | Mod: QW,,, | Performed by: FAMILY MEDICINE

## 2023-12-05 PROCEDURE — 87502 POCT INFLUENZA A/B MOLECULAR: ICD-10-PCS | Mod: QW,,, | Performed by: FAMILY MEDICINE

## 2023-12-05 PROCEDURE — 87635: ICD-10-PCS | Mod: QW,,, | Performed by: FAMILY MEDICINE

## 2023-12-05 PROCEDURE — 99213 PR OFFICE/OUTPT VISIT, EST, LEVL III, 20-29 MIN: ICD-10-PCS | Mod: ,,, | Performed by: FAMILY MEDICINE

## 2023-12-05 PROCEDURE — 87502 INFLUENZA DNA AMP PROBE: CPT | Mod: QW,,, | Performed by: FAMILY MEDICINE

## 2023-12-05 PROCEDURE — 99213 OFFICE O/P EST LOW 20 MIN: CPT | Mod: ,,, | Performed by: FAMILY MEDICINE

## 2023-12-05 RX ORDER — PROGESTERONE 100 MG/1
100 CAPSULE ORAL NIGHTLY
COMMUNITY
Start: 2023-11-28

## 2023-12-05 RX ORDER — NIRMATRELVIR AND RITONAVIR 300-100 MG
KIT ORAL
Qty: 30 TABLET | Refills: 0 | Status: SHIPPED | OUTPATIENT
Start: 2023-12-05

## 2023-12-05 NOTE — PATIENT INSTRUCTIONS
With Concern for COVID-19 infection. Swabs obtained today. COVID-19 Test positive  Adequate hydration and rest. Monitor the symptoms closely  Recommendation is to follow a timeline quarantine measures per CDC and L[facility]  Tylenol as needed for fever, body aches and headache. Antihistamine of choice for congestion.   Robitussin-DM for cough and cold as needed and as directed.  Trial of vitamin C, zinc 50 mg, vitamin D3 5000 IU while in quarantine  At home quarantine instructions for 5 days since start of symptoms, no fever (100.4 and above) for 24 hours and with improved symptoms can stop home quarantine  Wear a mask, cover your cough and sneeze. Clean any shared surfaces  Antiviral medication Paxlovid discussed in detail voiced understanding.  Call or return to clinic for any questions. ER precautions with any acute change in symptoms. Follow up with primary MD  Flu test negative.  Work excuse rest of the week

## 2023-12-05 NOTE — PROGRESS NOTES
Subjective:      Patient ID: Lyssa Paul is a 43 y.o. female.    Vitals:  height is 5' (1.524 m) and weight is 54.4 kg (120 lb). Her temperature is 99.9 °F (37.7 °C). Her blood pressure is 110/73 and her pulse is 90. Her respiration is 16 and oxygen saturation is 100%.     Chief Complaint: Generalized Body Aches (Body aches, dry cough, sinus pressure, headache x yesterday evening. Taking Advil, Tylenol. )    HPI:  43-year-old female present to clinic with concerns of feeling feverish, body aches, coughing congestion and headache since yesterday.  Symptoms started late in the evening, over-the-counter medication as listed above some help.  States possible exposure to infection as she goes school to school for work    ROS :  Constitutional : _ fever , Body aches, Chills  Eyes : _No redness, drainage or pain  HENT_sore throat, postnasal drainage  Respiratory_no wheezing, no shortness of breath  Cardiovascular_no chest pain  Gastrointestinal_ No vomiting, No diarrhea, No abdominal pain  Musculoskeletal_no joint pain, no joint swelling  Integumentary_no skin rash     Objective:     Physical Exam  General : Alert and Oriented, No apparent distress, afebrile, sounds congested and stuffy   Neck - supple  HENT : Oropharynx no redness or swelling.  Bilateral TMs intact mild fluid no redness.   Respiratory : Bilateral equal breath sounds, nonlabored respirations  Cardiovascular : Rate, rhythm regular, normal volume pulse, no murmur  Gastrointestinal: Full abdomen, soft, nontender to palpate  Integumentary : Warm, Dry and no rash    Assessment:     1. COVID-19    2. Body aches      Plan:   With Concern for COVID-19 infection. Swabs obtained today. COVID-19 Test positive  Adequate hydration and rest. Monitor the symptoms closely  Recommendation is to follow a timeline quarantine measures per CDC and LDH  Tylenol as needed for fever, body aches and headache. Antihistamine of choice for congestion.   Robitussin-DM for  cough and cold as needed and as directed.  Trial of vitamin C, zinc 50 mg, vitamin D3 5000 IU while in quarantine  At home quarantine instructions for 5 days since start of symptoms, no fever (100.4 and above) for 24 hours and with improved symptoms can stop home quarantine  Wear a mask, cover your cough and sneeze. Clean any shared surfaces  Antiviral medication Paxlovid discussed in detail voiced understanding.  Call or return to clinic for any questions. ER precautions with any acute change in symptoms. Follow up with primary MD  Flu test negative.  Work excuse rest of the week    COVID-19    Body aches  -     POCT Influenza A/B MOLECULAR  -     POCT COVID-19 Rapid Screening

## 2023-12-11 ENCOUNTER — PATIENT MESSAGE (OUTPATIENT)
Dept: RESEARCH | Facility: HOSPITAL | Age: 43
End: 2023-12-11
Payer: COMMERCIAL

## 2025-02-20 ENCOUNTER — TELEPHONE (OUTPATIENT)
Dept: SURGERY | Facility: CLINIC | Age: 45
End: 2025-02-20

## 2025-02-20 DIAGNOSIS — K21.9 GASTROESOPHAGEAL REFLUX DISEASE WITHOUT ESOPHAGITIS: Primary | ICD-10-CM

## 2025-02-20 NOTE — TELEPHONE ENCOUNTER
Just FYI referral is not from Dr. Fry its Dr. Pena.   Pt needs esophagram before appt. Placed orders, [facility] will call her to schedule

## 2025-02-27 ENCOUNTER — HOSPITAL ENCOUNTER (OUTPATIENT)
Dept: RADIOLOGY | Facility: HOSPITAL | Age: 45
Discharge: HOME | End: 2025-02-27
Attending: SURGERY
Payer: COMMERCIAL

## 2025-02-27 DIAGNOSIS — K21.9 GASTROESOPHAGEAL REFLUX DISEASE WITHOUT ESOPHAGITIS: ICD-10-CM

## 2025-02-27 PROCEDURE — 25500020 PHARM REV CODE 255: Performed by: SURGERY

## 2025-02-27 PROCEDURE — 74220 X-RAY XM ESOPHAGUS 1CNTRST: CPT | Mod: TC

## 2025-02-27 PROCEDURE — A9698 NON-RAD CONTRAST MATERIALNOC: HCPCS | Performed by: SURGERY

## 2025-02-27 RX ADMIN — BARIUM SULFATE 15 ML: 0.6 SUSPENSION ORAL at 08:02

## 2025-02-27 RX ADMIN — BARIUM SULFATE 10 ML: 980 POWDER, FOR SUSPENSION ORAL at 08:02

## 2025-03-11 ENCOUNTER — RESULTS FOLLOW-UP (OUTPATIENT)
Dept: SURGERY | Facility: CLINIC | Age: 45
End: 2025-03-11

## 2025-03-11 ENCOUNTER — OFFICE VISIT (OUTPATIENT)
Dept: URGENT CARE | Facility: CLINIC | Age: 45
End: 2025-03-11
Payer: COMMERCIAL

## 2025-03-11 VITALS
SYSTOLIC BLOOD PRESSURE: 114 MMHG | TEMPERATURE: 98 F | BODY MASS INDEX: 23.56 KG/M2 | WEIGHT: 120 LBS | OXYGEN SATURATION: 95 % | HEIGHT: 60 IN | RESPIRATION RATE: 18 BRPM | HEART RATE: 97 BPM | DIASTOLIC BLOOD PRESSURE: 74 MMHG

## 2025-03-11 DIAGNOSIS — J10.1 INFLUENZA A: Primary | ICD-10-CM

## 2025-03-11 DIAGNOSIS — K44.9 HIATAL HERNIA: Primary | ICD-10-CM

## 2025-03-11 DIAGNOSIS — R52 BODY ACHES: ICD-10-CM

## 2025-03-11 LAB
CTP QC/QA: YES
CTP QC/QA: YES
POC MOLECULAR INFLUENZA A AGN: POSITIVE
POC MOLECULAR INFLUENZA B AGN: NEGATIVE
SARS CORONAVIRUS 2 ANTIGEN: NEGATIVE

## 2025-03-11 PROCEDURE — 87502 INFLUENZA DNA AMP PROBE: CPT | Mod: QW,,, | Performed by: FAMILY MEDICINE

## 2025-03-11 PROCEDURE — 87811 SARS-COV-2 COVID19 W/OPTIC: CPT | Mod: QW,,, | Performed by: FAMILY MEDICINE

## 2025-03-11 PROCEDURE — 96372 THER/PROPH/DIAG INJ SC/IM: CPT | Mod: ,,, | Performed by: FAMILY MEDICINE

## 2025-03-11 PROCEDURE — 99213 OFFICE O/P EST LOW 20 MIN: CPT | Mod: 25,,, | Performed by: FAMILY MEDICINE

## 2025-03-11 RX ORDER — BETAMETHASONE SODIUM PHOSPHATE AND BETAMETHASONE ACETATE 3; 3 MG/ML; MG/ML
6 INJECTION, SUSPENSION INTRA-ARTICULAR; INTRALESIONAL; INTRAMUSCULAR; SOFT TISSUE
Status: COMPLETED | OUTPATIENT
Start: 2025-03-11 | End: 2025-03-11

## 2025-03-11 RX ORDER — OSELTAMIVIR PHOSPHATE 75 MG/1
75 CAPSULE ORAL 2 TIMES DAILY
Qty: 10 CAPSULE | Refills: 0 | Status: SHIPPED | OUTPATIENT
Start: 2025-03-11 | End: 2025-03-16

## 2025-03-11 RX ADMIN — BETAMETHASONE SODIUM PHOSPHATE AND BETAMETHASONE ACETATE 6 MG: 3; 3 INJECTION, SUSPENSION INTRA-ARTICULAR; INTRALESIONAL; INTRAMUSCULAR; SOFT TISSUE at 09:03

## 2025-03-11 NOTE — PROGRESS NOTES
Please inform patient she has a 4 cm hiatal hernia. Recommend appt to discuss further workup and surgical options.

## 2025-03-11 NOTE — PROGRESS NOTES
Subjective:      Patient ID: Lyssa Paul is a 44 y.o. female.    Vitals:  height is 5' (1.524 m) and weight is 54.4 kg (120 lb). Her temperature is 98.3 °F (36.8 °C). Her blood pressure is 114/74 and her pulse is 97. Her respiration is 18 and oxygen saturation is 95%.     Chief Complaint: Cough     Patient is a 44 y.o. female who presents to urgent care with complaints of cough, chest congestion, fever, BA  x2 days. Alleviating factors include tylenol, advil with mild amount of relief.     ROS :  Constitutional : _ fever , Body aches, Chills  Eyes : _No redness, drainage or pain  HENT_sore throat, postnasal drainage  Respiratory_no wheezing, no shortness of breath  Cardiovascular_no chest pain  Gastrointestinal_ denies nausea vomiting abdominal pain or diarrhea  Musculoskeletal_no joint pain, no joint swelling  Integumentary_no skin rash or abnormal lesion    [place]:  44-year-old male with known history of ADHD currently on medications.  , Present to clinic with concerns of fever, body aches, congestion and coughing since 2 days.  Over-the-counter medications some help.  No fever in the clinic.  Possible exposure to infections.  No chest pain, no shortness a breath or wheezing.  Appears concerned with chest congestion and coughing.  No history of asthma.  Does not smoke tobacco.  Has tolerated cortisone injection in the past, understands the risks and benefits    Objective:     Physical Exam  General : Alert and Oriented, No apparent distress, afebrile, sounds stuffy congested and coughing  Neck - supple  HENT : Oropharynx no redness or swelling.  Bilateral TMs intact mild fluid no redness.   Respiratory : Bilateral equal breath sounds, nonlabored respirations, no bronchial breath sounds  Cardiovascular : Rate, rhythm regular, normal volume pulse, no murmur  Gastrointestinal: Full abdomen, soft, nontender to palpate  Integumentary : Warm, Dry and no rash    Assessment:     1. Influenza A     2. Body aches      Plan:   Flu swab positive for influenza a, negative for influenza B.  Condition course and antiviral medication discussed  Adequate hydration and rest.   Warm saltwater gargles for sore throat.   Alternate Tylenol and ibuprofen every 4 hours for fever, body aches and headache.   Claritin 10 mg for nasal congestion.   Robitussin DM for cough and cold medication as needed and as directed.   Noninfectious:  No fever (100.4 and below) without fever reducing agent and overall improvement in the symptoms  Return to clinic as needed, call this clinic for any questions  Work excuse 3 days    Influenza A  -     betamethasone acetate-betamethasone sodium phosphate injection 6 mg  -     oseltamivir (TAMIFLU) 75 MG capsule; Take 1 capsule (75 mg total) by mouth 2 (two) times daily. for 5 days  Dispense: 10 capsule; Refill: 0    Body aches  -     POCT Influenza A/B Molecular  -     SARS Coronavirus 2 Antigen, POCT Manual Read

## 2025-03-19 ENCOUNTER — OFFICE VISIT (OUTPATIENT)
Dept: ORTHOPEDICS | Facility: CLINIC | Age: 45
End: 2025-03-19
Payer: COMMERCIAL

## 2025-03-19 ENCOUNTER — HOSPITAL ENCOUNTER (OUTPATIENT)
Dept: RADIOLOGY | Facility: CLINIC | Age: 45
Discharge: HOME | End: 2025-03-19
Attending: ORTHOPAEDIC SURGERY
Payer: COMMERCIAL

## 2025-03-19 VITALS
HEART RATE: 91 BPM | DIASTOLIC BLOOD PRESSURE: 82 MMHG | WEIGHT: 119.94 LBS | SYSTOLIC BLOOD PRESSURE: 116 MMHG | HEIGHT: 60 IN | BODY MASS INDEX: 23.55 KG/M2

## 2025-03-19 DIAGNOSIS — T84.84XA PAINFUL ORTHOPAEDIC HARDWARE: ICD-10-CM

## 2025-03-19 DIAGNOSIS — M25.561 ACUTE PAIN OF RIGHT KNEE: ICD-10-CM

## 2025-03-19 DIAGNOSIS — S83.241A ACUTE MEDIAL MENISCUS TEAR OF RIGHT KNEE, INITIAL ENCOUNTER: Primary | ICD-10-CM

## 2025-03-19 PROCEDURE — 73564 X-RAY EXAM KNEE 4 OR MORE: CPT | Mod: RT,,, | Performed by: ORTHOPAEDIC SURGERY

## 2025-03-19 RX ORDER — ESTRADIOL 1 MG/1
0.5 TABLET ORAL DAILY
COMMUNITY
Start: 2024-12-16

## 2025-03-19 RX ORDER — METFORMIN HYDROCHLORIDE 500 MG/1
500 TABLET, EXTENDED RELEASE ORAL 2 TIMES DAILY WITH MEALS
COMMUNITY
Start: 2025-03-03

## 2025-03-19 RX ORDER — BUPROPION HCL 300 MG
1 TABLET, EXTENDED RELEASE 24 HR ORAL DAILY
COMMUNITY

## 2025-03-19 RX ORDER — TESTOSTERONE
POWDER (GRAM) MISCELLANEOUS
COMMUNITY
Start: 2024-10-22

## 2025-03-19 RX ORDER — SERTRALINE HYDROCHLORIDE 25 MG/1
25 TABLET, FILM COATED ORAL DAILY
COMMUNITY
Start: 2025-02-04

## 2025-03-19 NOTE — PROGRESS NOTES
Chief Complaint:   Chief Complaint   Patient presents with    Right Knee - Pain     Rt knee pain, reports had ACL repair done last year by Dr. Bland 4/24/24, reports also broke leg 7/14/24, wbat, ambulates without assistance, reports pain has not went away since her sx, did try cortisone inj with no relief, reports will get painful enough unable to bear weight, certain movements or touching in certain place will cause pain,        Consulting Physician: No ref. provider found    History of present illness:    she is a pleasant 44 y.o. year old female with right knee pain.  She initially sustained a right ACL tear in February 20, 2024 and underwent ACL reconstruction with quad tendon autograft in April of 2024 by Dr. Lake.  She did well postoperatively but sustained a lateral tibial plateau fracture after tripping over her dog in July of 2024.  The tibial plateau fracture was treated nonoperatively.  She has been participating in formal physical therapy over the last 9 months or so.  She has had some persistent pain medially along the joint line and also medially over the hardware.  At times it gets painful enough that she has difficulty in bearing weight.  She occasionally has mechanical symptoms within the knee with catching and locking.  She has tried an injection in the knee without relief.  She is using anti-inflammatory medicines.  She denies any numbness or tingling    Past Medical History:   Diagnosis Date    Chronic sinusitis, unspecified     Depression     Headache        Past Surgical History:   Procedure Laterality Date    ABDOMINAL SURGERY      for endometriosis    APPENDECTOMY      EXCISION, NASAL TURBINATE, SUBMUCOSAL Bilateral 2/16/2023    Procedure: EXCISION, NASAL TURBINATE, SUBMUCOSAL;  Surgeon: Kenney Fry MD;  Location: [facility];  Service: ENT;  Laterality: Bilateral;    FUNCTIONAL ENDOSCOPIC SINUS SURGERY (FESS) N/A 2/16/2023    Procedure: FESS, USING COMPUTER-ASSISTED NAVIGATION /  Medtronic Irrigation / Excision Left Marie;  Surgeon: Kenney Fry MD;  Location: [facility] OR;  Service: ENT;  Laterality: N/A;  did not need aleksandra    FUNCTIONAL ENDOSCOPIC SINUS SURGERY (FESS) Right 3/24/2023    Procedure: FESS (FUNCTIONAL ENDOSCOPIC SINUS SURGERY) revision  Right frontal sinus with medtronic irrigation;  Surgeon: Kenney Fry MD;  Location: [facility] OR;  Service: ENT;  Laterality: Right;  rev fess with propel stents    NASAL SEPTOPLASTY Bilateral 2/16/2023    Procedure: SEPTOPLASTY, NOSE;  Surgeon: Kenney Fry MD;  Location: [facility] OR;  Service: ENT;  Laterality: Bilateral;    SINUS SURGERY         Current Outpatient Medications   Medication Sig    amphetamine sulfate (EVEKEO) 10 mg Tab Take 10 mg by mouth 2 (two) times a day.    estradioL (ESTRACE) 1 MG tablet Take 0.5 mg by mouth once daily.    metFORMIN (GLUCOPHAGE-XR) 500 MG ER 24hr tablet Take 500 mg by mouth 2 (two) times daily with meals.    sertraline (ZOLOFT) 25 MG tablet Take 25 mg by mouth once daily.    testosterone, bulk, Powd Place 1 Vipin sublingual every other day- allow to dissolve.  Indications: post-menopausal symptoms, low libido; HRT    WELLBUTRIN  mg 24 hr tablet Take 1 tablet by mouth once daily.    cholecalciferol, vitamin D3, 125 mcg (5,000 unit) Tab Take 5,000 Units by mouth.    dextromethorphan HBr/bupropion (AUVELITY ORAL) Take 1 tablet by mouth Daily.    progesterone (PROMETRIUM) 100 MG capsule Take 100 mg by mouth every evening.     No current facility-administered medications for this visit.       Review of patient's allergies indicates:   Allergen Reactions    Cat/feline products Dermatitis, Hives, Itching and Rash    Ragweed Dermatitis, Hives, Itching and Rash       Family History   Problem Relation Name Age of Onset    Thyroid disease Mother      No Known Problems Father      No Known Problems Sister      Thyroid disease Brother         Social History[1]    Review of Systems:    Constitution:    Denies chills, fever, and sweats.  HENT:   Denies headaches or blurry vision.  Cardiovascular:  Denies chest pain or irregular heart beat.  Respiratory:   Denies cough or shortness of breath.  Gastrointestinal:  Denies abdominal pain, nausea, or vomiting.  Musculoskeletal:   Denies muscle cramps.  Neurological:   Denies dizziness or focal weakness.  Psychiatric/Behavior: Normal mental status.  Hematology/Lymph:  Denies bleeding problem or easy bruising/bleeding.  Skin:    Denies rash or suspicious lesions.    Examination:    Vital Signs:    Vitals:    03/19/25 1454   BP: 116/82   Pulse: 91   Weight: 54.4 kg (119 lb 14.9 oz)   Height: 5' (1.524 m)       Body mass index is 23.42 kg/m².    Constitution:   Well-developed, well nourished patient in no acute distress.  Neurological:   Alert and oriented x 3 and cooperative to examination.     Psychiatric/Behavior: Normal mental status.  Respiratory:   No shortness of breath.  Eyes:    Extraoccular muscles intact  Skin:    No scars, rash or suspicious lesions.    MSK:   Standing exam  stance: normal alignment, no significant leg-length discrepancy  gait: mild antaglic    Knee examination  - General comments: unremarkable appearance    - Tenderness:medial joint line and tibia hardware    Knee                  RIGHT    LEFT  Skin:                  Intact      Intact  ROM:                 0-130      0-130  Effusion:              +        Neg  MJL TTP:           +         Neg  LJL TTP:            Neg         Neg  Shabana:           +         Neg  Pat crep:            Neg         Neg  Patella TTPs:     Neg         Neg  Patella grind:      Neg        Neg  Lachman:           Neg        Neg  Pivot shift:          Neg        Neg  Valgus stress:    Neg        Neg  Varus stress:      Neg        Neg  Posterior drawer: Neg       Neg    N-V intact intact  Hip: nml nml    Lower extremity edema:Negative     Imaging: X-rays ordered and images interpreted today personally by me of four  views of right knee shows appropriate implant position.       Assessment: Acute medial meniscus tear of right knee, initial encounter  -     MRI Knee Without Contrast Right; Future; Expected date: 03/19/2025    Painful orthopaedic hardware  -     X-Ray Knee Complete 4 Or More Views Right; Future; Expected date: 03/19/2025  -     MRI Knee Without Contrast Right; Future; Expected date: 03/19/2025        Plan:  Will order a MRI to further see if she has a torn medial meniscus. Will see her back after to go over results. Will schedule hardware removal surgery to have her tibial button removed and possibly joint arthroscopy pending MRI       Rodri Gresham MD personally performed the services described in this documentation, including but not limited to patient's history, physical examination, and assessment and plan of care. All medical record entries made by Yanelis Moreno ATC, OTC were performed at his direction and in his presence. The medical record was reviewed and is accurate and complete.          [1]   Social History  Socioeconomic History    Marital status:    Tobacco Use    Smoking status: Never    Smokeless tobacco: Never   Substance and Sexual Activity    Alcohol use: Yes     Alcohol/week: 4.0 standard drinks of alcohol     Types: 4 Glasses of wine per week     Comment: occasional    Drug use: Never    Sexual activity: Yes     Partners: Male     Birth control/protection: Partner-Vasectomy, None

## 2025-03-24 RX ORDER — NIRMATRELVIR AND RITONAVIR 300-100 MG
KIT ORAL
COMMUNITY

## 2025-03-26 ENCOUNTER — OFFICE VISIT (OUTPATIENT)
Dept: ORTHOPEDICS | Facility: CLINIC | Age: 45
End: 2025-03-26
Payer: COMMERCIAL

## 2025-03-26 VITALS
SYSTOLIC BLOOD PRESSURE: 118 MMHG | BODY MASS INDEX: 23.55 KG/M2 | HEIGHT: 60 IN | WEIGHT: 119.94 LBS | DIASTOLIC BLOOD PRESSURE: 73 MMHG | HEART RATE: 123 BPM

## 2025-03-26 DIAGNOSIS — T84.84XA PAINFUL ORTHOPAEDIC HARDWARE: Primary | ICD-10-CM

## 2025-03-26 DIAGNOSIS — M94.269 TIBIAL PLATEAU CHONDROMALACIA: ICD-10-CM

## 2025-03-26 DIAGNOSIS — S83.281A ACUTE LATERAL MENISCUS TEAR OF RIGHT KNEE, INITIAL ENCOUNTER: ICD-10-CM

## 2025-03-26 NOTE — PROGRESS NOTES
Chief Complaint:   Chief Complaint   Patient presents with    Right Knee - Pain    Knee Pain     MRI results right knee, still has pain in knee where hardware is       Consulting Physician: No ref. provider found    History of present illness:    she is a pleasant 44 y.o. year old female with right knee pain.  She initially sustained a right ACL tear in February 20, 2024 and underwent ACL reconstruction with quad tendon autograft in April of 2024 by Dr. Lake.  She did well postoperatively but sustained a lateral tibial plateau fracture after tripping over her dog in July of 2024.  The tibial plateau fracture was treated nonoperatively.  She has been participating in formal physical therapy over the last 9 months or so.  She has had some persistent pain medially along the joint line and also medially over the hardware.  At times it gets painful enough that she has difficulty in bearing weight.  She occasionally has mechanical symptoms within the knee with catching and locking.  She has tried an injection in the knee without relief.  She is using anti-inflammatory medicines.  She denies any numbness or tingling.    She returns today status post MRI of right knee.     Past Medical History:   Diagnosis Date    Anxiety ?    Chronic sinusitis, unspecified     Depression ?    Headache     Hypothyroidism 2013    Resolved       Past Surgical History:   Procedure Laterality Date    ABDOMINAL SURGERY      for endometriosis    APPENDECTOMY      EXCISION, NASAL TURBINATE, SUBMUCOSAL Bilateral 2/16/2023    Procedure: EXCISION, NASAL TURBINATE, SUBMUCOSAL;  Surgeon: Kenney Fry MD;  Location: [facility] OR;  Service: ENT;  Laterality: Bilateral;    FUNCTIONAL ENDOSCOPIC SINUS SURGERY (FESS) N/A 2/16/2023    Procedure: FESS, USING COMPUTER-ASSISTED NAVIGATION / Medtronic Irrigation / Excision Left Marie;  Surgeon: Kenney Fry MD;  Location: [facility] OR;  Service: ENT;  Laterality: N/A;  did not need aleksandra    FUNCTIONAL  ENDOSCOPIC SINUS SURGERY (FESS) Right 3/24/2023    Procedure: FESS (FUNCTIONAL ENDOSCOPIC SINUS SURGERY) revision  Right frontal sinus with medtronic irrigation;  Surgeon: Kenney Fry MD;  Location: [facility] OR;  Service: ENT;  Laterality: Right;  rev fess with propel stents    NASAL SEPTOPLASTY Bilateral 2/16/2023    Procedure: SEPTOPLASTY, NOSE;  Surgeon: Kenney Fry MD;  Location: [facility] OR;  Service: ENT;  Laterality: Bilateral;    SINUS SURGERY         Current Outpatient Medications   Medication Sig    amphetamine sulfate (EVEKEO) 10 mg Tab Take 10 mg by mouth 2 (two) times a day.    estradioL (ESTRACE) 1 MG tablet Take 0.5 mg by mouth once daily.    metFORMIN (GLUCOPHAGE-XR) 500 MG ER 24hr tablet Take 500 mg by mouth 2 (two) times daily with meals.    nirmatrelvir-ritonavir (PAXLOVID) 300 mg (150 mg x 2)-100 mg copackaged tablets (EUA) TAKE 3 TABLETS BY MOUTH TWICE A DAY FOR 5 DAYS    sertraline (ZOLOFT) 25 MG tablet Take 25 mg by mouth once daily.    testosterone, bulk, Powd Place 1 Vipin sublingual every other day- allow to dissolve.  Indications: post-menopausal symptoms, low libido; HRT    WELLBUTRIN  mg 24 hr tablet Take 1 tablet by mouth once daily.    cholecalciferol, vitamin D3, 125 mcg (5,000 unit) Tab Take 5,000 Units by mouth.    dextromethorphan HBr/bupropion (AUVELITY ORAL) Take 1 tablet by mouth Daily.    progesterone (PROMETRIUM) 100 MG capsule Take 100 mg by mouth every evening.     No current facility-administered medications for this visit.       Review of patient's allergies indicates:   Allergen Reactions    Cat/feline products Dermatitis, Hives, Itching and Rash    Ragweed Dermatitis, Hives, Itching and Rash       Family History   Problem Relation Name Age of Onset    Thyroid disease Mother      No Known Problems Father      No Known Problems Sister      Thyroid disease Brother         Social History[1]    Review of Systems:    Constitution:   Denies chills, fever, and  sweats.  HENT:   Denies headaches or blurry vision.  Cardiovascular:  Denies chest pain or irregular heart beat.  Respiratory:   Denies cough or shortness of breath.  Gastrointestinal:  Denies abdominal pain, nausea, or vomiting.  Musculoskeletal:   Denies muscle cramps.  Neurological:   Denies dizziness or focal weakness.  Psychiatric/Behavior: Normal mental status.  Hematology/Lymph:  Denies bleeding problem or easy bruising/bleeding.  Skin:    Denies rash or suspicious lesions.    Examination:    Vital Signs:    Vitals:    03/26/25 0759 03/26/25 0800   BP: 118/73    Pulse: (!) 123    Weight: 54.4 kg (119 lb 14.9 oz)    Height: 5' (1.524 m)    PainSc:    3       Body mass index is 23.42 kg/m².    Constitution:   Well-developed, well nourished patient in no acute distress.  Neurological:   Alert and oriented x 3 and cooperative to examination.     Psychiatric/Behavior: Normal mental status.  Respiratory:   No shortness of breath.  Eyes:    Extraoccular muscles intact  Skin:    No scars, rash or suspicious lesions.    MSK:   Standing exam  stance: normal alignment, no significant leg-length discrepancy  gait: mild antaglic    Knee examination  - General comments: unremarkable appearance    - Tenderness:medial joint line and tibia hardware    Knee                  RIGHT    LEFT  Skin:                  Intact      Intact  ROM:                 0-130      0-130  Effusion:              +        Neg  MJL TTP:           +         Neg  LJL TTP:            Neg         Neg  Shabana:           +         Neg  Pat crep:            Neg         Neg  Patella TTPs:     Neg         Neg  Patella grind:      Neg        Neg  Lachman:           Neg        Neg  Pivot shift:          Neg        Neg  Valgus stress:    Neg        Neg  Varus stress:      Neg        Neg  Posterior drawer: Neg       Neg    N-V intact intact  Hip: nml nml    Lower extremity edema:Negative     Imaging: X-rays ordered and images interpreted today personally by me  of four views of right knee shows appropriate implant position. MRI of right knee shows:    chronic healed lateral tibial plateau fracture deformity.     Associated 2 cm long full-thickness anterior-posterior oriented cartilage fissure in the lateral tibial plateau.     Degenerative intrasubstance signal at the lateral meniscus anterior horn, without definite underlying tear.     Intact anterior cruciate ligament reconstruction graft.     Small knee joint effusion.     Small cartilage fissure in the medial trochlear facet.     Assessment: Painful orthopaedic hardware    Acute lateral meniscus tear of right knee, initial encounter          Plan:  Discussed this patient's MRI with patient.  I have recommended surgical intervention: Right knee arthroscopic tibial plateau chondroplasty, removal of tibial hardware.  We discussed the details of the procedure and the expected postoperative course.  We discussed the benefits of surgery which would be to decrease her pain and increase her function.  We discussed the risks of surgery which is small but could be significant if she has pain, stiffness, or infection.  After discussion she would like to proceed.  She will call me with the surgical day      Rodri Gresham MD personally performed the services described in this documentation, including but not limited to patient's history, physical examination, and assessment and plan of care. All medical record entries made by Yanelis Moreno, ATC, OTC were performed at his direction and in his presence. The medical record was reviewed and is accurate and complete.            [1]   Social History  Socioeconomic History    Marital status:    Tobacco Use    Smoking status: Never    Smokeless tobacco: Never   Substance and Sexual Activity    Alcohol use: Yes     Alcohol/week: 4.0 standard drinks of alcohol     Types: 4 Glasses of wine per week     Comment: occasional    Drug use: Never    Sexual activity: Yes     Partners: Male      Birth control/protection: Partner-Vasectomy, None     Social Drivers of Health     Financial Resource Strain: Low Risk  (3/20/2025)    Overall Financial Resource Strain (CARDIA)     Difficulty of Paying Living Expenses: Not hard at all   Food Insecurity: No Food Insecurity (3/20/2025)    Hunger Vital Sign     Worried About Running Out of Food in the Last Year: Never true     Ran Out of Food in the Last Year: Never true   Transportation Needs: No Transportation Needs (3/20/2025)    PRAPARE - Transportation     Lack of Transportation (Medical): No     Lack of Transportation (Non-Medical): No   Physical Activity: Sufficiently Active (3/20/2025)    Exercise Vital Sign     Days of Exercise per Week: 7 days     Minutes of Exercise per Session: 60 min   Stress: Stress Concern Present (3/20/2025)    [employer] [place] [employer] - Occupational Stress Questionnaire     Feeling of Stress : Rather much   Housing Stability: Low Risk  (3/20/2025)    Housing Stability Vital Sign     Unable to Pay for Housing in the Last Year: No     Number of Times Moved in the Last Year: 1     Homeless in the Last Year: No

## 2025-03-27 ENCOUNTER — OFFICE VISIT (OUTPATIENT)
Dept: SURGERY | Facility: CLINIC | Age: 45
End: 2025-03-27
Payer: COMMERCIAL

## 2025-03-27 VITALS
HEIGHT: 60 IN | BODY MASS INDEX: 23.83 KG/M2 | WEIGHT: 121.38 LBS | DIASTOLIC BLOOD PRESSURE: 85 MMHG | SYSTOLIC BLOOD PRESSURE: 147 MMHG | HEART RATE: 111 BPM

## 2025-03-27 DIAGNOSIS — K44.9 HIATAL HERNIA: ICD-10-CM

## 2025-03-27 DIAGNOSIS — Z01.818 PREOP EXAMINATION: Primary | ICD-10-CM

## 2025-03-27 PROCEDURE — 1159F MED LIST DOCD IN RCRD: CPT | Mod: CPTII,,, | Performed by: SURGERY

## 2025-03-27 PROCEDURE — 3077F SYST BP >= 140 MM HG: CPT | Mod: CPTII,,, | Performed by: SURGERY

## 2025-03-27 PROCEDURE — 3079F DIAST BP 80-89 MM HG: CPT | Mod: CPTII,,, | Performed by: SURGERY

## 2025-03-27 PROCEDURE — 99204 OFFICE O/P NEW MOD 45 MIN: CPT | Mod: ,,, | Performed by: SURGERY

## 2025-03-27 PROCEDURE — 3008F BODY MASS INDEX DOCD: CPT | Mod: CPTII,,, | Performed by: SURGERY

## 2025-03-31 ENCOUNTER — TELEPHONE (OUTPATIENT)
Dept: SURGERY | Facility: CLINIC | Age: 45
End: 2025-03-31
Payer: COMMERCIAL

## 2025-03-31 NOTE — TELEPHONE ENCOUNTER
"Pt called asking for post nissen diet sent to her via email  ( working on that ) . She also asked for post op guidelines. ..  3. She states she is ready to proceed w/ scheduling nissen towards June / July time frame and would like to schedule her motility test. ( Will call her to schedule ) .   4. She would like to also discuss potentially fixing an umbilical hernia that was found in '22 . She is asking if that can be done togther vs getting "cut open" twice. Please advise.   "

## 2025-04-02 NOTE — TELEPHONE ENCOUNTER
I'll have to ask Charmaine about the umbilical hernia, but sometimes they do close it up. After I speak with him I will call and discuss with pt.

## 2025-04-14 DIAGNOSIS — R13.14 DYSPHAGIA, PHARYNGOESOPHAGEAL PHASE: ICD-10-CM

## 2025-04-14 DIAGNOSIS — K44.9 HIATAL HERNIA: Primary | ICD-10-CM

## 2025-04-14 DIAGNOSIS — K59.00 COLONIC CONSTIPATION: ICD-10-CM

## 2025-04-14 DIAGNOSIS — R10.33 UMBILICAL PAIN: ICD-10-CM

## 2025-04-14 DIAGNOSIS — R10.13 ABDOMINAL PAIN, EPIGASTRIC: ICD-10-CM

## 2025-04-14 DIAGNOSIS — R11.0 NAUSEA: ICD-10-CM

## 2025-04-16 ENCOUNTER — HOSPITAL ENCOUNTER (OUTPATIENT)
Dept: RADIOLOGY | Facility: HOSPITAL | Age: 45
Discharge: HOME | End: 2025-04-16
Attending: NURSE PRACTITIONER
Payer: COMMERCIAL

## 2025-04-16 DIAGNOSIS — R11.0 NAUSEA: ICD-10-CM

## 2025-04-16 DIAGNOSIS — K44.9 HIATAL HERNIA: ICD-10-CM

## 2025-04-16 DIAGNOSIS — K59.00 COLONIC CONSTIPATION: ICD-10-CM

## 2025-04-16 DIAGNOSIS — R10.13 ABDOMINAL PAIN, EPIGASTRIC: ICD-10-CM

## 2025-04-16 DIAGNOSIS — R13.14 DYSPHAGIA, PHARYNGOESOPHAGEAL PHASE: ICD-10-CM

## 2025-04-16 DIAGNOSIS — R10.33 UMBILICAL PAIN: ICD-10-CM

## 2025-04-16 PROCEDURE — 25500020 PHARM REV CODE 255: Performed by: NURSE PRACTITIONER

## 2025-04-16 PROCEDURE — 76700 US EXAM ABDOM COMPLETE: CPT | Mod: TC

## 2025-04-16 PROCEDURE — 74220 X-RAY XM ESOPHAGUS 1CNTRST: CPT | Mod: TC

## 2025-04-16 PROCEDURE — A9698 NON-RAD CONTRAST MATERIALNOC: HCPCS | Performed by: NURSE PRACTITIONER

## 2025-04-16 RX ADMIN — BARIUM SULFATE 20 ML: 0.6 SUSPENSION ORAL at 08:04

## 2025-04-22 NOTE — PROGRESS NOTES
[facility] Surgical - General Surgery Services  [address] [place] [id number]  Phone: [phone]  Fax: [phone]     HISTORY & PHYSICAL  General Surgery  Dr. Terence Montano      Patient Name: Lyssa Paul  YOB: 1980  Author: Terence Montano MD     Date: 04/22/2025                   SUBJECTIVE:     Chief Complaint/Reason for Admission:   Chief Complaint   Patient presents with    Consult     *4 cm Hiatal Hernia          History of Present Illness:  Ms. Lyssa Paul is a 44 y.o. female who presents to clinic for surgical evaluation of intractable reflux.     Allegic to nickel    Positive symptoms:   Heartburn, Regurgitation, Dysphagia, Chest Pain, and Voice changes    Previous treatments:   PPI    Referring provider: Stephen Pena MD GI  Patient Care Team:  Cherise Mendiola MD as PCP - General (Obstetrics and Gynecology)  Terence Montano MD (General Surgery)  Rashaun Fry MD (Gastroenterology)     Pertinent workup:    FL Esophagram Complete  EXAMINATION:  FL ESOPHAGRAM COMPLETE    CLINICAL HISTORY:  Diaphragmatic hernia without obstruction or gangrene    TECHNIQUE:  Contrast material: Barium sulfate suspension with marshmallow cream    RADIATION DOSE:  Fluoroscopy time: 40 sec    Fluoroscopy dose: Reference air kerma 5 mGy    COMPARISON:  Esophagram 02/27/2025    FINDINGS:  Swallowing: Normal    Esophagus: Marshmallow cream bolus was administered in Trendelenburg prone TILLEY position.  Bolus passed with a single peristaltic wave.  Grossly normal esophageal motility.    Gastroesophageal reflux: None observed.    Other findings: Small sliding hiatal hernia    Electronically signed by: Marcie Wu  Date:    04/16/2025  Time:    10:14  US Abdomen Complete  Narrative: EXAMINATION:  US ABDOMEN COMPLETE    CLINICAL HISTORY:  Diaphragmatic hernia without obstruction or gangrene nausea.    COMPARISON:  No previous studies are available  for comparison.    FINDINGS:  Grayscale, color and spectral Doppler evaluation of the abdomen.    No focal abnormality of the limited visualized pancreas.    Visualized portions of the aorta and inferior vena cava are normal in caliber.    The liver is not significantly enlarged. There is no focal liver lesion.  Normal hepatopetal flow is noted in the portal vein.    The gallbladder is free of stones. No gallbladder wall thickening or pericholecystic fluid.  The common bile duct is normal in caliber  and measures 2-3 mm.    The right kidney measures 9 cm, while the left measures 9 cm.  There is no hydronephrosis or solid renal mass.    The spleen is normal in size and echogenicity  and measures 10 cm.  Impression: No significant sonographic abnormality of the abdomen.    Electronically signed by: Alden Munroe  Date:    04/16/2025  Time:    08:40      Review of Systems:  12 point ROS negative except as stated in HPI    PAST HISTORY:     Past Medical History:   Diagnosis Date    Anxiety ?    Chronic sinusitis, unspecified     Depression ?    Headache     Hypothyroidism 2013    Resolved     Past Surgical History:   Procedure Laterality Date    ABDOMINAL SURGERY      for endometriosis    APPENDECTOMY      EXCISION, NASAL TURBINATE, SUBMUCOSAL Bilateral 2/16/2023    Procedure: EXCISION, NASAL TURBINATE, SUBMUCOSAL;  Surgeon: Kenney Fry MD;  Location: [facility] OR;  Service: ENT;  Laterality: Bilateral;    FUNCTIONAL ENDOSCOPIC SINUS SURGERY (FESS) N/A 2/16/2023    Procedure: FESS, USING COMPUTER-ASSISTED NAVIGATION / Medtronic Irrigation / Excision Left Marie;  Surgeon: Kenney Fry MD;  Location: [facility] OR;  Service: ENT;  Laterality: N/A;  did not need aleksandra    FUNCTIONAL ENDOSCOPIC SINUS SURGERY (FESS) Right 3/24/2023    Procedure: FESS (FUNCTIONAL ENDOSCOPIC SINUS SURGERY) revision  Right frontal sinus with medtronic irrigation;  Surgeon: Kenney Fry MD;  Location: [facility] OR;  Service: ENT;  Laterality:  Right;  rev fess with propel stents    NASAL SEPTOPLASTY Bilateral 2/16/2023    Procedure: SEPTOPLASTY, NOSE;  Surgeon: Kenney Fry MD;  Location: [facility];  Service: ENT;  Laterality: Bilateral;    SINUS SURGERY       Family History   Problem Relation Name Age of Onset    Thyroid disease Mother      No Known Problems Father      No Known Problems Sister      Thyroid disease Brother       Social History     Socioeconomic History    Marital status:    Tobacco Use    Smoking status: Never    Smokeless tobacco: Never   Substance and Sexual Activity    Alcohol use: Yes     Alcohol/week: 4.0 standard drinks of alcohol     Types: 4 Glasses of wine per week     Comment: occasional    Drug use: Never    Sexual activity: Yes     Partners: Male     Birth control/protection: Partner-Vasectomy, None     Social Drivers of Health     Financial Resource Strain: Low Risk  (3/20/2025)    Overall Financial Resource Strain (CARDIA)     Difficulty of Paying Living Expenses: Not hard at all   Food Insecurity: No Food Insecurity (3/20/2025)    Hunger Vital Sign     Worried About Running Out of Food in the Last Year: Never true     Ran Out of Food in the Last Year: Never true   Transportation Needs: No Transportation Needs (3/20/2025)    PRAPARE - Transportation     Lack of Transportation (Medical): No     Lack of Transportation (Non-Medical): No   Physical Activity: Sufficiently Active (3/20/2025)    Exercise Vital Sign     Days of Exercise per Week: 7 days     Minutes of Exercise per Session: 60 min   Stress: Stress Concern Present (3/20/2025)    [place] [employer] - Occupational Stress Questionnaire     Feeling of Stress : Rather much   Housing Stability: Low Risk  (3/20/2025)    Housing Stability Vital Sign     Unable to Pay for Housing in the Last Year: No     Number of Times Moved in the Last Year: 1     Homeless in the Last Year: No       MEDICATIONS & ALLERGIES:     Current Outpatient  Medications on File Prior to Visit   Medication Sig    amphetamine sulfate (EVEKEO) 10 mg Tab Take 10 mg by mouth 2 (two) times a day.    estradioL (ESTRACE) 1 MG tablet Take 0.5 mg by mouth once daily.    metFORMIN (GLUCOPHAGE-XR) 500 MG ER 24hr tablet Take 500 mg by mouth 2 (two) times daily with meals.    sertraline (ZOLOFT) 25 MG tablet Take 25 mg by mouth once daily.    testosterone, bulk, Powd Place 1 Vipin sublingual every other day- allow to dissolve.  Indications: post-menopausal symptoms, low libido; HRT    WELLBUTRIN  mg 24 hr tablet Take 1 tablet by mouth once daily.    cholecalciferol, vitamin D3, 125 mcg (5,000 unit) Tab Take 5,000 Units by mouth.    dextromethorphan HBr/bupropion (AUVELITY ORAL) Take 1 tablet by mouth Daily.    nirmatrelvir-ritonavir (PAXLOVID) 300 mg (150 mg x 2)-100 mg copackaged tablets (EUA) TAKE 3 TABLETS BY MOUTH TWICE A DAY FOR 5 DAYS    progesterone (PROMETRIUM) 100 MG capsule Take 100 mg by mouth every evening.     No current facility-administered medications on file prior to visit.     Review of patient's allergies indicates:   Allergen Reactions    Cat/feline products Dermatitis, Hives, Itching and Rash    Ragweed Dermatitis, Hives, Itching and Rash    Nickel sutures [surgical stainless steel]      Nickel       OBJECTIVE:     Vitals:    03/27/25 1013   BP: (!) 147/85   Pulse: (!) 111   Weight: 55.1 kg (121 lb 6.4 oz)   Height: 5' (1.524 m)     Body mass index is 23.71 kg/m².    Physical Exam:  General:  Well developed, well nourished, no acute distress  HEENT:  Normocephalic, atraumatic, PERRL, EOMI, clear sclera, neck supple  CVS:  RRR, S1 and S2 normal, no murmurs, rubs, gallops  Resp:  Lungs clear to auscultation, no wheezes, rales, rhonchi, cough  GI:  Abdomen soft, non-tender, non-distended, normoactive bowel sounds, no masses  :   Deferred  MSK:  No muscle atrophy, cyanosis, peripheral edema, full range of motion  Skin:  No rashes, ulcers, erythema  Neuro:   CNII-XII grossly intact  Psych:  Alert and oriented to person, place, and time    Results:  I have independently reviewed all pertinent lab and radiologic studies relevant to general surgery.      VISIT DIAGNOSES:       ICD-10-CM ICD-9-CM   1. Preop examination  Z01.818 V72.84   2. Hiatal hernia  K44.9 553.3       ASSESSMENT/PLAN:     Plan: Laproscopic Nissen Fundoplication    - Order esophagram, esophageal manometry/motility study, EGD if not done within 12 months.     - Dr. Montano discussed surgical options for anti-reflux procedure. Complete workup listed above then plan to have pt return to clinic to discuss surgery more in detail. Dr. Montano discussed non-surgical options (continued medical mgmt, dietary/lifestyle changes) vs. surgical intervention (Laparoscopic Nissen fundoplication vs. Toupet fundoplication (if decreased motility)vs. Kristi Nissen gastroplasty), Laparoscopic repair of hiatal hernia, and magnetic sphincter augmentation Linx procedure.         - Dr. Montano examined patient, discussed recommendations, obtained informed consent, and answered all questions with patient/family.     - Counseling included differential diagnoses, treatment options, risks and benefits, lifestyle changes, prognosis, and medications.    The patient was interactive, and verbalized understanding.    Terence Montano MD

## 2025-05-20 ENCOUNTER — TELEPHONE (OUTPATIENT)
Dept: SURGERY | Facility: CLINIC | Age: 45
End: 2025-05-20
Payer: COMMERCIAL

## 2025-05-20 NOTE — TELEPHONE ENCOUNTER
She needs to do another one. Those are to old. No need to request as we can see them in results review.

## 2025-05-20 NOTE — TELEPHONE ENCOUNTER
Pt states she doesn't want to do anymore CT's as she's had enough radiation into her body from several scans everywhere. Will await pre op appt on 6.12 to discuss everything .

## 2025-06-16 ENCOUNTER — OFFICE VISIT (OUTPATIENT)
Dept: ORTHOPEDICS | Facility: CLINIC | Age: 45
End: 2025-06-16
Payer: COMMERCIAL

## 2025-06-16 VITALS
WEIGHT: 120 LBS | HEART RATE: 87 BPM | SYSTOLIC BLOOD PRESSURE: 122 MMHG | DIASTOLIC BLOOD PRESSURE: 78 MMHG | BODY MASS INDEX: 23.56 KG/M2 | HEIGHT: 60 IN

## 2025-06-16 DIAGNOSIS — M94.269 TIBIAL PLATEAU CHONDROMALACIA: ICD-10-CM

## 2025-06-16 DIAGNOSIS — T84.84XA PAINFUL ORTHOPAEDIC HARDWARE: Primary | ICD-10-CM

## 2025-06-16 PROCEDURE — 3074F SYST BP LT 130 MM HG: CPT | Mod: CPTII,,, | Performed by: NURSE PRACTITIONER

## 2025-06-16 PROCEDURE — 3008F BODY MASS INDEX DOCD: CPT | Mod: CPTII,,, | Performed by: NURSE PRACTITIONER

## 2025-06-16 PROCEDURE — 3078F DIAST BP <80 MM HG: CPT | Mod: CPTII,,, | Performed by: NURSE PRACTITIONER

## 2025-06-16 PROCEDURE — 1159F MED LIST DOCD IN RCRD: CPT | Mod: CPTII,,, | Performed by: NURSE PRACTITIONER

## 2025-06-16 PROCEDURE — 99213 OFFICE O/P EST LOW 20 MIN: CPT | Mod: ,,, | Performed by: NURSE PRACTITIONER

## 2025-06-16 RX ORDER — PROGESTERONE 100 %
1 POWDER (GRAM) MISCELLANEOUS NIGHTLY
COMMUNITY

## 2025-06-16 RX ORDER — TESTOSTERONE CYPIONATE 200 MG/ML
INJECTION, SOLUTION INTRAMUSCULAR
COMMUNITY
Start: 2025-05-22

## 2025-06-16 RX ORDER — SODIUM CHLORIDE 9 MG/ML
INJECTION, SOLUTION INTRAVENOUS CONTINUOUS
OUTPATIENT
Start: 2025-06-16

## 2025-06-16 RX ORDER — SPIRONOLACTONE 100 MG/1
1 TABLET, FILM COATED ORAL EVERY MORNING
COMMUNITY
Start: 2025-04-03

## 2025-06-16 RX ORDER — PRAZOSIN HYDROCHLORIDE 1 MG/1
1 CAPSULE ORAL NIGHTLY
COMMUNITY
Start: 2025-04-15

## 2025-06-16 NOTE — PROGRESS NOTES
Chief Complaint:   Chief Complaint   Patient presents with    Right Knee - Pre-op Exam     Preop - Right knee - sx 6/26       Consulting Physician: No ref. provider found    History of present illness:    she is a pleasant 44 y.o. year old female with right knee pain.  She initially sustained a right ACL tear in February 20, 2024 and underwent ACL reconstruction with quad tendon autograft in April of 2024 by Dr. Lake.  She did well postoperatively but sustained a lateral tibial plateau fracture after tripping over her dog in July of 2024.  The tibial plateau fracture was treated nonoperatively.  She has been participating in formal physical therapy over the last 9 months or so.  She has had some persistent pain medially along the joint line and also medially over the hardware.  At times it gets painful enough that she has difficulty in bearing weight.  She occasionally has mechanical symptoms within the knee with catching and locking.  She has tried an injection in the knee without relief.  She is using anti-inflammatory medicines.  She denies any numbness or tingling.    She returns today for preop.  She has continued right knee pain.  Of note she was a quad tendon autograft and has had continued quad weakness, pain, and instability since surgery.    Past Medical History:   Diagnosis Date    Anxiety ?    Chronic sinusitis, unspecified     Depression ?    Headache     Hypothyroidism 2013    Resolved       Past Surgical History:   Procedure Laterality Date    ABDOMINAL SURGERY      for endometriosis    APPENDECTOMY  01/2022    EXCISION, NASAL TURBINATE, SUBMUCOSAL Bilateral 02/16/2023    Procedure: EXCISION, NASAL TURBINATE, SUBMUCOSAL;  Surgeon: Kenney Fry MD;  Location: [facility];  Service: ENT;  Laterality: Bilateral;    FUNCTIONAL ENDOSCOPIC SINUS SURGERY (FESS) N/A 02/16/2023    Procedure: FESS, USING COMPUTER-ASSISTED NAVIGATION / Medtronic Irrigation / Excision Left Marie;  Surgeon: Kenney VERDUZCO  MD Lisandra;  Location: [facility] OR;  Service: ENT;  Laterality: N/A;  did not need aleksandra    FUNCTIONAL ENDOSCOPIC SINUS SURGERY (FESS) Right 03/24/2023    Procedure: FESS (FUNCTIONAL ENDOSCOPIC SINUS SURGERY) revision  Right frontal sinus with medtronic irrigation;  Surgeon: Kenney Fry MD;  Location: [facility] OR;  Service: ENT;  Laterality: Right;  rev fess with propel stents    NASAL SEPTOPLASTY Bilateral 02/16/2023    Procedure: SEPTOPLASTY, NOSE;  Surgeon: Kenney Fry MD;  Location: [facility] OR;  Service: ENT;  Laterality: Bilateral;    SINUS SURGERY         Current Outpatient Medications   Medication Sig    amphetamine sulfate (EVEKEO) 10 mg Tab Take 10 mg by mouth 2 (two) times a day.    calcium-vitamin D 250 mg-2.5 mcg (100 unit) per tablet Take 1 tablet by mouth 2 (two) times daily.    cholecalciferol, vitamin D3, 125 mcg (5,000 unit) Tab Take 5,000 Units by mouth.    dextromethorphan HBr/bupropion (AUVELITY ORAL) Take 1 tablet by mouth Daily.    magnesium aspartate HCl 61 mg (615 mg) TbEC Take by mouth once.    metFORMIN (GLUCOPHAGE-XR) 500 MG ER 24hr tablet Take 500 mg by mouth once daily.    prazosin (MINIPRESS) 1 MG Cap Take 1 mg by mouth.    progesterone, bulk, 100 % Powd     spironolactone (ALDACTONE) 100 MG tablet Take 1 tablet by mouth every morning.    testosterone cypionate (DEPOTESTOTERONE CYPIONATE) 200 mg/mL injection INJECT 0.1 ML INTRAMUSCULARLY ONCE WEEKLY    estradioL (ESTRACE) 1 MG tablet Take 0.5 mg by mouth once daily.    nirmatrelvir-ritonavir (PAXLOVID) 300 mg (150 mg x 2)-100 mg copackaged tablets (EUA) TAKE 3 TABLETS BY MOUTH TWICE A DAY FOR 5 DAYS    progesterone (PROMETRIUM) 100 MG capsule Take 100 mg by mouth every evening.    sertraline (ZOLOFT) 25 MG tablet Take 25 mg by mouth once daily.    testosterone, bulk, Powd Place 1 Vipin sublingual every other day- allow to dissolve.  Indications: post-menopausal symptoms, low libido; HRT    WELLBUTRIN  mg 24 hr tablet Take 1  tablet by mouth once daily.     No current facility-administered medications for this visit.       Review of patient's allergies indicates:   Allergen Reactions    Cat/feline products Dermatitis, Hives, Itching and Rash    Ragweed Dermatitis, Hives, Itching and Rash    Nickel     Nickel sutures [surgical stainless steel]      Nickel       Family History   Problem Relation Name Age of Onset    Thyroid disease Mother Anita Martínez     Hyperlipidemia Mother Anita Martínez     No Known Problems Father      No Known Problems Sister      Thyroid disease Brother      Hyperlipidemia Maternal Grandmother Lizabeth Quinteros     Depression Maternal Grandmother Lizabeth Quinteros     Cancer Paternal Grandfather Joseluis Martínez II     Diabetes Paternal Grandmother Neftaly Martínez        Social History[1]    Review of Systems:    Constitution:   Denies chills, fever, and sweats.  HENT:   Denies headaches or blurry vision.  Cardiovascular:  Denies chest pain or irregular heart beat.  Respiratory:   Denies cough or shortness of breath.  Gastrointestinal:  Denies abdominal pain, nausea, or vomiting.  Musculoskeletal:   Denies muscle cramps.  Neurological:   Denies dizziness or focal weakness.  Psychiatric/Behavior: Normal mental status.  Hematology/Lymph:  Denies bleeding problem or easy bruising/bleeding.  Skin:    Denies rash or suspicious lesions.    Examination:    Vital Signs:    Vitals:    06/16/25 1252   BP: 122/78   Pulse: 87   Weight: 54.4 kg (120 lb)   Height: 5' (1.524 m)   PainSc:   5   PainLoc: Knee       Body mass index is 23.44 kg/m².    Constitution:   Well-developed, well nourished patient in no acute distress.  Neurological:   Alert and oriented x 3 and cooperative to examination.     Psychiatric/Behavior: Normal mental status.  Respiratory:   No shortness of breath.  Eyes:    Extraoccular muscles intact  Skin:    No scars, rash or suspicious lesions.    MSK:   Standing exam  stance: normal alignment, no significant leg-length  discrepancy  gait: mild antaglic    Knee examination  - General comments: right quad atrophy    - Tenderness:medial joint line and tibia hardware    Knee                  RIGHT    LEFT  Skin:                  Intact      Intact  ROM:                 0-130      0-130  Effusion:              +            Neg  MJL TTP:          Neg           Neg  LJL TTP:            Neg         Neg  Shabana:           +            Neg  Pat crep:            Neg         Neg  Patella TTPs:     Neg         Neg  Patella grind:      Neg        Neg  Lachman:           Neg        Neg  Pivot shift:          Neg        Neg  Valgus stress:    Neg        Neg  Varus stress:      Neg        Neg  Posterior drawer: Neg       Neg    N-V intact intact  Hip: nml nml    Lower extremity edema:Negative     Imaging:  Previous x-rays of four views of right knee shows appropriate implant position. MRI of right knee shows:    chronic healed lateral tibial plateau fracture deformity.     Associated 2 cm long full-thickness anterior-posterior oriented cartilage fissure in the lateral tibial plateau.     Degenerative intrasubstance signal at the lateral meniscus anterior horn, without definite underlying tear.     Intact anterior cruciate ligament reconstruction graft.     Small knee joint effusion.     Small cartilage fissure in the medial trochlear facet.     Assessment: Painful orthopaedic hardware  -     Place in Outpatient; Standing  -     Case Request Operating Room: ARTHROSCOPY, KNEE, W/ CHONDROPLASTY - tibial plateau, REMOVAL, HARDWARE, LOWER EXTREMITY - tibial hardware  -     Vital signs; Standing  -     Cleanse with Chlorhexidine (CHG); Standing  -     Diet NPO; Standing  -     Chlorohexidine Gluconate Bath; Standing  -     Full code; Standing  -     Place sequential compression device; Standing    Tibial plateau chondromalacia    Other orders  -     0.9% NaCl infusion  -     IP VTE LOW RISK PATIENT; Standing  -     ceFAZolin (Ancef) 2 g in D5W 50 mL  IVPB          Plan:  The patient is agreeable to surgical intervention: Right knee arthroscopic tibial plateau chondroplasty, removal of tibial hardware.  We discussed the details of the procedure and the expected postoperative course.  We discussed the benefits of surgery which would be to decrease her pain and increase her function.  We discussed the risks of surgery which is small but could be significant if she has pain, stiffness, or infection.  After discussion she would like to proceed.  Plans for surgery June 26      Patient continues to have some instability in the operative knee due to continued quadriceps weakness as a result of postoperative muscle atrophy. For that reason, I would like to order a functional ACL brace for this patient to wear when returning to activities. This will provide the patient with increased stability and prevent subsequent injury to the operative knee.         [1]   Social History  Socioeconomic History    Marital status:    Tobacco Use    Smoking status: Never    Smokeless tobacco: Never   Substance and Sexual Activity    Alcohol use: Yes     Alcohol/week: 4.0 standard drinks of alcohol     Types: 4 Glasses of wine per week     Comment: rarely    Drug use: Never    Sexual activity: Yes     Partners: Male     Birth control/protection: Partner-Vasectomy, None     Social Drivers of Health     Financial Resource Strain: Low Risk  (3/20/2025)    Overall Financial Resource Strain (CARDIA)     Difficulty of Paying Living Expenses: Not hard at all   Food Insecurity: No Food Insecurity (3/20/2025)    Hunger Vital Sign     Worried About Running Out of Food in the Last Year: Never true     Ran Out of Food in the Last Year: Never true   Transportation Needs: No Transportation Needs (3/20/2025)    PRAPARE - Transportation     Lack of Transportation (Medical): No     Lack of Transportation (Non-Medical): No   Physical Activity: Sufficiently Active (3/20/2025)    Exercise Vital Sign      Days of Exercise per Week: 7 days     Minutes of Exercise per Session: 60 min   Stress: Stress Concern Present (3/20/2025)    Citizen of [place] [employer] - Occupational Stress Questionnaire     Feeling of Stress : Rather much   Housing Stability: Low Risk  (3/20/2025)    Housing Stability Vital Sign     Unable to Pay for Housing in the Last Year: No     Number of Times Moved in the Last Year: 1     Homeless in the Last Year: No

## 2025-06-16 NOTE — H&P (VIEW-ONLY)
Chief Complaint:   Chief Complaint   Patient presents with    Right Knee - Pre-op Exam     Preop - Right knee - sx 6/26       Consulting Physician: No ref. provider found    History of present illness:    she is a pleasant 44 y.o. year old female with right knee pain.  She initially sustained a right ACL tear in February 20, 2024 and underwent ACL reconstruction with quad tendon autograft in April of 2024 by Dr. Lake.  She did well postoperatively but sustained a lateral tibial plateau fracture after tripping over her dog in July of 2024.  The tibial plateau fracture was treated nonoperatively.  She has been participating in formal physical therapy over the last 9 months or so.  She has had some persistent pain medially along the joint line and also medially over the hardware.  At times it gets painful enough that she has difficulty in bearing weight.  She occasionally has mechanical symptoms within the knee with catching and locking.  She has tried an injection in the knee without relief.  She is using anti-inflammatory medicines.  She denies any numbness or tingling.    She returns today for preop.  She has continued right knee pain.  Of note she was a quad tendon autograft and has had continued quad weakness, pain, and instability since surgery.    Past Medical History:   Diagnosis Date    Anxiety ?    Chronic sinusitis, unspecified     Depression ?    Headache     Hypothyroidism 2013    Resolved       Past Surgical History:   Procedure Laterality Date    ABDOMINAL SURGERY      for endometriosis    APPENDECTOMY  01/2022    EXCISION, NASAL TURBINATE, SUBMUCOSAL Bilateral 02/16/2023    Procedure: EXCISION, NASAL TURBINATE, SUBMUCOSAL;  Surgeon: Kenney Fry MD;  Location: [facility];  Service: ENT;  Laterality: Bilateral;    FUNCTIONAL ENDOSCOPIC SINUS SURGERY (FESS) N/A 02/16/2023    Procedure: FESS, USING COMPUTER-ASSISTED NAVIGATION / Medtronic Irrigation / Excision Left Marie;  Surgeon: Kenney VERDUZCO  MD Lisandra;  Location: [facility] OR;  Service: ENT;  Laterality: N/A;  did not need aleksandra    FUNCTIONAL ENDOSCOPIC SINUS SURGERY (FESS) Right 03/24/2023    Procedure: FESS (FUNCTIONAL ENDOSCOPIC SINUS SURGERY) revision  Right frontal sinus with medtronic irrigation;  Surgeon: Kenney Fry MD;  Location: [facility] OR;  Service: ENT;  Laterality: Right;  rev fess with propel stents    NASAL SEPTOPLASTY Bilateral 02/16/2023    Procedure: SEPTOPLASTY, NOSE;  Surgeon: Kenney Fry MD;  Location: [facility] OR;  Service: ENT;  Laterality: Bilateral;    SINUS SURGERY         Current Outpatient Medications   Medication Sig    amphetamine sulfate (EVEKEO) 10 mg Tab Take 10 mg by mouth 2 (two) times a day.    calcium-vitamin D 250 mg-2.5 mcg (100 unit) per tablet Take 1 tablet by mouth 2 (two) times daily.    cholecalciferol, vitamin D3, 125 mcg (5,000 unit) Tab Take 5,000 Units by mouth.    dextromethorphan HBr/bupropion (AUVELITY ORAL) Take 1 tablet by mouth Daily.    magnesium aspartate HCl 61 mg (615 mg) TbEC Take by mouth once.    metFORMIN (GLUCOPHAGE-XR) 500 MG ER 24hr tablet Take 500 mg by mouth once daily.    prazosin (MINIPRESS) 1 MG Cap Take 1 mg by mouth.    progesterone, bulk, 100 % Powd     spironolactone (ALDACTONE) 100 MG tablet Take 1 tablet by mouth every morning.    testosterone cypionate (DEPOTESTOTERONE CYPIONATE) 200 mg/mL injection INJECT 0.1 ML INTRAMUSCULARLY ONCE WEEKLY    estradioL (ESTRACE) 1 MG tablet Take 0.5 mg by mouth once daily.    nirmatrelvir-ritonavir (PAXLOVID) 300 mg (150 mg x 2)-100 mg copackaged tablets (EUA) TAKE 3 TABLETS BY MOUTH TWICE A DAY FOR 5 DAYS    progesterone (PROMETRIUM) 100 MG capsule Take 100 mg by mouth every evening.    sertraline (ZOLOFT) 25 MG tablet Take 25 mg by mouth once daily.    testosterone, bulk, Powd Place 1 Vipin sublingual every other day- allow to dissolve.  Indications: post-menopausal symptoms, low libido; HRT    WELLBUTRIN  mg 24 hr tablet Take 1  tablet by mouth once daily.     No current facility-administered medications for this visit.       Review of patient's allergies indicates:   Allergen Reactions    Cat/feline products Dermatitis, Hives, Itching and Rash    Ragweed Dermatitis, Hives, Itching and Rash    Nickel     Nickel sutures [surgical stainless steel]      Nickel       Family History   Problem Relation Name Age of Onset    Thyroid disease Mother Anita Martínez     Hyperlipidemia Mother Anita Martínez     No Known Problems Father      No Known Problems Sister      Thyroid disease Brother      Hyperlipidemia Maternal Grandmother Lizabeth Quinteros     Depression Maternal Grandmother Lizabeth Quinteros     Cancer Paternal Grandfather Joseluis Martínez II     Diabetes Paternal Grandmother Neftaly Martínez        Social History[1]    Review of Systems:    Constitution:   Denies chills, fever, and sweats.  HENT:   Denies headaches or blurry vision.  Cardiovascular:  Denies chest pain or irregular heart beat.  Respiratory:   Denies cough or shortness of breath.  Gastrointestinal:  Denies abdominal pain, nausea, or vomiting.  Musculoskeletal:   Denies muscle cramps.  Neurological:   Denies dizziness or focal weakness.  Psychiatric/Behavior: Normal mental status.  Hematology/Lymph:  Denies bleeding problem or easy bruising/bleeding.  Skin:    Denies rash or suspicious lesions.    Examination:    Vital Signs:    Vitals:    06/16/25 1252   BP: 122/78   Pulse: 87   Weight: 54.4 kg (120 lb)   Height: 5' (1.524 m)   PainSc:   5   PainLoc: Knee       Body mass index is 23.44 kg/m².    Constitution:   Well-developed, well nourished patient in no acute distress.  Neurological:   Alert and oriented x 3 and cooperative to examination.     Psychiatric/Behavior: Normal mental status.  Respiratory:   No shortness of breath.  Eyes:    Extraoccular muscles intact  Skin:    No scars, rash or suspicious lesions.    MSK:   Standing exam  stance: normal alignment, no significant leg-length  discrepancy  gait: mild antaglic    Knee examination  - General comments: right quad atrophy    - Tenderness:medial joint line and tibia hardware    Knee                  RIGHT    LEFT  Skin:                  Intact      Intact  ROM:                 0-130      0-130  Effusion:              +            Neg  MJL TTP:          Neg           Neg  LJL TTP:            Neg         Neg  Shabana:           +            Neg  Pat crep:            Neg         Neg  Patella TTPs:     Neg         Neg  Patella grind:      Neg        Neg  Lachman:           Neg        Neg  Pivot shift:          Neg        Neg  Valgus stress:    Neg        Neg  Varus stress:      Neg        Neg  Posterior drawer: Neg       Neg    N-V intact intact  Hip: nml nml    Lower extremity edema:Negative     Imaging:  Previous x-rays of four views of right knee shows appropriate implant position. MRI of right knee shows:    chronic healed lateral tibial plateau fracture deformity.     Associated 2 cm long full-thickness anterior-posterior oriented cartilage fissure in the lateral tibial plateau.     Degenerative intrasubstance signal at the lateral meniscus anterior horn, without definite underlying tear.     Intact anterior cruciate ligament reconstruction graft.     Small knee joint effusion.     Small cartilage fissure in the medial trochlear facet.     Assessment: Painful orthopaedic hardware  -     Place in Outpatient; Standing  -     Case Request Operating Room: ARTHROSCOPY, KNEE, W/ CHONDROPLASTY - tibial plateau, REMOVAL, HARDWARE, LOWER EXTREMITY - tibial hardware  -     Vital signs; Standing  -     Cleanse with Chlorhexidine (CHG); Standing  -     Diet NPO; Standing  -     Chlorohexidine Gluconate Bath; Standing  -     Full code; Standing  -     Place sequential compression device; Standing    Tibial plateau chondromalacia    Other orders  -     0.9% NaCl infusion  -     IP VTE LOW RISK PATIENT; Standing  -     ceFAZolin (Ancef) 2 g in D5W 50 mL  IVPB          Plan:  The patient is agreeable to surgical intervention: Right knee arthroscopic tibial plateau chondroplasty, removal of tibial hardware.  We discussed the details of the procedure and the expected postoperative course.  We discussed the benefits of surgery which would be to decrease her pain and increase her function.  We discussed the risks of surgery which is small but could be significant if she has pain, stiffness, or infection.  After discussion she would like to proceed.  Plans for surgery June 26      Patient continues to have some instability in the operative knee due to continued quadriceps weakness as a result of postoperative muscle atrophy. For that reason, I would like to order a functional ACL brace for this patient to wear when returning to activities. This will provide the patient with increased stability and prevent subsequent injury to the operative knee.         [1]   Social History  Socioeconomic History    Marital status:    Tobacco Use    Smoking status: Never    Smokeless tobacco: Never   Substance and Sexual Activity    Alcohol use: Yes     Alcohol/week: 4.0 standard drinks of alcohol     Types: 4 Glasses of wine per week     Comment: rarely    Drug use: Never    Sexual activity: Yes     Partners: Male     Birth control/protection: Partner-Vasectomy, None     Social Drivers of Health     Financial Resource Strain: Low Risk  (3/20/2025)    Overall Financial Resource Strain (CARDIA)     Difficulty of Paying Living Expenses: Not hard at all   Food Insecurity: No Food Insecurity (3/20/2025)    Hunger Vital Sign     Worried About Running Out of Food in the Last Year: Never true     Ran Out of Food in the Last Year: Never true   Transportation Needs: No Transportation Needs (3/20/2025)    PRAPARE - Transportation     Lack of Transportation (Medical): No     Lack of Transportation (Non-Medical): No   Physical Activity: Sufficiently Active (3/20/2025)    Exercise Vital Sign      Days of Exercise per Week: 7 days     Minutes of Exercise per Session: 60 min   Stress: Stress Concern Present (3/20/2025)    Citizen of [place] [employer] - Occupational Stress Questionnaire     Feeling of Stress : Rather much   Housing Stability: Low Risk  (3/20/2025)    Housing Stability Vital Sign     Unable to Pay for Housing in the Last Year: No     Number of Times Moved in the Last Year: 1     Homeless in the Last Year: No

## 2025-06-17 ENCOUNTER — ANESTHESIA EVENT (OUTPATIENT)
Dept: SURGERY | Facility: HOSPITAL | Age: 45
End: 2025-06-17
Payer: COMMERCIAL

## 2025-06-26 ENCOUNTER — HOSPITAL ENCOUNTER (OUTPATIENT)
Facility: HOSPITAL | Age: 45
Discharge: HOME | End: 2025-06-26
Attending: ORTHOPAEDIC SURGERY | Admitting: ORTHOPAEDIC SURGERY
Payer: COMMERCIAL

## 2025-06-26 ENCOUNTER — ANESTHESIA (OUTPATIENT)
Dept: SURGERY | Facility: HOSPITAL | Age: 45
End: 2025-06-26
Payer: COMMERCIAL

## 2025-06-26 VITALS
RESPIRATION RATE: 20 BRPM | WEIGHT: 122.56 LBS | TEMPERATURE: 97 F | DIASTOLIC BLOOD PRESSURE: 63 MMHG | OXYGEN SATURATION: 98 % | HEART RATE: 88 BPM | SYSTOLIC BLOOD PRESSURE: 93 MMHG | BODY MASS INDEX: 24.06 KG/M2 | HEIGHT: 60 IN

## 2025-06-26 DIAGNOSIS — T84.84XA PAINFUL ORTHOPAEDIC HARDWARE: Primary | ICD-10-CM

## 2025-06-26 LAB
B-HCG UR QL: NEGATIVE
CTP QC/QA: YES

## 2025-06-26 PROCEDURE — 29877 ARTHRS KNEE SURG DBRDMT/SHVG: CPT | Mod: RT,,, | Performed by: ORTHOPAEDIC SURGERY

## 2025-06-26 PROCEDURE — 36000711: Performed by: ORTHOPAEDIC SURGERY

## 2025-06-26 PROCEDURE — 37000008 HC ANESTHESIA 1ST 15 MINUTES: Performed by: ORTHOPAEDIC SURGERY

## 2025-06-26 PROCEDURE — 63600175 PHARM REV CODE 636 W HCPCS: Performed by: STUDENT IN AN ORGANIZED HEALTH CARE EDUCATION/TRAINING PROGRAM

## 2025-06-26 PROCEDURE — 71000016 HC POSTOP RECOV ADDL HR: Performed by: ORTHOPAEDIC SURGERY

## 2025-06-26 PROCEDURE — 25000003 PHARM REV CODE 250: Performed by: STUDENT IN AN ORGANIZED HEALTH CARE EDUCATION/TRAINING PROGRAM

## 2025-06-26 PROCEDURE — 63600175 PHARM REV CODE 636 W HCPCS: Performed by: ORTHOPAEDIC SURGERY

## 2025-06-26 PROCEDURE — 36000710: Performed by: ORTHOPAEDIC SURGERY

## 2025-06-26 PROCEDURE — 71000033 HC RECOVERY, INTIAL HOUR: Performed by: ORTHOPAEDIC SURGERY

## 2025-06-26 PROCEDURE — 37000009 HC ANESTHESIA EA ADD 15 MINS: Performed by: ORTHOPAEDIC SURGERY

## 2025-06-26 PROCEDURE — 25000003 PHARM REV CODE 250: Performed by: ORTHOPAEDIC SURGERY

## 2025-06-26 PROCEDURE — 27201423 OPTIME MED/SURG SUP & DEVICES STERILE SUPPLY: Performed by: ORTHOPAEDIC SURGERY

## 2025-06-26 PROCEDURE — 71000015 HC POSTOP RECOV 1ST HR: Performed by: ORTHOPAEDIC SURGERY

## 2025-06-26 PROCEDURE — 81025 URINE PREGNANCY TEST: CPT | Performed by: ORTHOPAEDIC SURGERY

## 2025-06-26 PROCEDURE — 63600175 PHARM REV CODE 636 W HCPCS: Performed by: NURSE PRACTITIONER

## 2025-06-26 PROCEDURE — 29877 ARTHRS KNEE SURG DBRDMT/SHVG: CPT | Mod: AS,RT,, | Performed by: NURSE PRACTITIONER

## 2025-06-26 PROCEDURE — 63600175 PHARM REV CODE 636 W HCPCS: Performed by: ANESTHESIOLOGY

## 2025-06-26 RX ORDER — ACETAMINOPHEN 10 MG/ML
INJECTION, SOLUTION INTRAVENOUS
Status: COMPLETED
Start: 2025-06-26 | End: 2025-06-26

## 2025-06-26 RX ORDER — PROPOFOL 10 MG/ML
VIAL (ML) INTRAVENOUS
Status: DISCONTINUED | OUTPATIENT
Start: 2025-06-26 | End: 2025-06-26

## 2025-06-26 RX ORDER — PHENYLEPHRINE HCL IN 0.9% NACL 1 MG/10 ML
SYRINGE (ML) INTRAVENOUS
Status: DISCONTINUED | OUTPATIENT
Start: 2025-06-26 | End: 2025-06-26

## 2025-06-26 RX ORDER — ACETAMINOPHEN 10 MG/ML
INJECTION, SOLUTION INTRAVENOUS
Status: DISCONTINUED | OUTPATIENT
Start: 2025-06-26 | End: 2025-06-26

## 2025-06-26 RX ORDER — METHOCARBAMOL 100 MG/ML
1000 INJECTION, SOLUTION INTRAMUSCULAR; INTRAVENOUS ONCE
Status: COMPLETED | OUTPATIENT
Start: 2025-06-26 | End: 2025-06-26

## 2025-06-26 RX ORDER — BUPIVACAINE HYDROCHLORIDE 2.5 MG/ML
INJECTION, SOLUTION EPIDURAL; INFILTRATION; INTRACAUDAL; PERINEURAL
Status: DISCONTINUED
Start: 2025-06-26 | End: 2025-06-26 | Stop reason: HOSPADM

## 2025-06-26 RX ORDER — MUPIROCIN 20 MG/G
OINTMENT TOPICAL 2 TIMES DAILY
Status: DISCONTINUED | OUTPATIENT
Start: 2025-06-26 | End: 2025-06-26 | Stop reason: HOSPADM

## 2025-06-26 RX ORDER — BUPIVACAINE HYDROCHLORIDE 2.5 MG/ML
INJECTION, SOLUTION EPIDURAL; INFILTRATION; INTRACAUDAL; PERINEURAL
Status: DISCONTINUED
Start: 2025-06-26 | End: 2025-06-26 | Stop reason: WASHOUT

## 2025-06-26 RX ORDER — ROCURONIUM BROMIDE 10 MG/ML
INJECTION, SOLUTION INTRAVENOUS
Status: DISCONTINUED | OUTPATIENT
Start: 2025-06-26 | End: 2025-06-26

## 2025-06-26 RX ORDER — ONDANSETRON HYDROCHLORIDE 2 MG/ML
4 INJECTION, SOLUTION INTRAVENOUS EVERY 12 HOURS PRN
Status: DISCONTINUED | OUTPATIENT
Start: 2025-06-26 | End: 2025-06-26 | Stop reason: HOSPADM

## 2025-06-26 RX ORDER — MIDAZOLAM HYDROCHLORIDE 1 MG/ML
INJECTION INTRAMUSCULAR; INTRAVENOUS
Status: DISCONTINUED | OUTPATIENT
Start: 2025-06-26 | End: 2025-06-26

## 2025-06-26 RX ORDER — LIDOCAINE HYDROCHLORIDE 20 MG/ML
INJECTION INTRAVENOUS
Status: DISCONTINUED | OUTPATIENT
Start: 2025-06-26 | End: 2025-06-26

## 2025-06-26 RX ORDER — OXYCODONE AND ACETAMINOPHEN 5; 325 MG/1; MG/1
1 TABLET ORAL EVERY 6 HOURS PRN
Qty: 10 TABLET | Refills: 0 | Status: SHIPPED | OUTPATIENT
Start: 2025-06-26

## 2025-06-26 RX ORDER — MIDAZOLAM HYDROCHLORIDE 2 MG/2ML
INJECTION, SOLUTION INTRAMUSCULAR; INTRAVENOUS
Status: DISCONTINUED
Start: 2025-06-26 | End: 2025-06-26 | Stop reason: WASHOUT

## 2025-06-26 RX ORDER — BUPIVACAINE HYDROCHLORIDE 2.5 MG/ML
INJECTION, SOLUTION EPIDURAL; INFILTRATION; INTRACAUDAL; PERINEURAL
Status: DISCONTINUED | OUTPATIENT
Start: 2025-06-26 | End: 2025-06-26 | Stop reason: HOSPADM

## 2025-06-26 RX ORDER — EPINEPHRINE 1 MG/ML
INJECTION, SOLUTION, CONCENTRATE INTRAVENOUS
Status: DISCONTINUED | OUTPATIENT
Start: 2025-06-26 | End: 2025-06-26 | Stop reason: HOSPADM

## 2025-06-26 RX ORDER — TRAMADOL HYDROCHLORIDE 50 MG/1
50 TABLET, FILM COATED ORAL EVERY 4 HOURS PRN
Status: DISCONTINUED | OUTPATIENT
Start: 2025-06-26 | End: 2025-06-26 | Stop reason: HOSPADM

## 2025-06-26 RX ORDER — KETOROLAC TROMETHAMINE 30 MG/ML
15 INJECTION, SOLUTION INTRAMUSCULAR; INTRAVENOUS EVERY 6 HOURS PRN
Status: DISCONTINUED | OUTPATIENT
Start: 2025-06-26 | End: 2025-06-26 | Stop reason: HOSPADM

## 2025-06-26 RX ORDER — SODIUM CHLORIDE 9 MG/ML
INJECTION, SOLUTION INTRAVENOUS CONTINUOUS
Status: DISCONTINUED | OUTPATIENT
Start: 2025-06-26 | End: 2025-06-26 | Stop reason: HOSPADM

## 2025-06-26 RX ORDER — MORPHINE SULFATE 4 MG/ML
3 INJECTION, SOLUTION INTRAMUSCULAR; INTRAVENOUS
Refills: 0 | Status: DISCONTINUED | OUTPATIENT
Start: 2025-06-26 | End: 2025-06-26 | Stop reason: HOSPADM

## 2025-06-26 RX ORDER — KETOROLAC TROMETHAMINE 10 MG/1
10 TABLET, FILM COATED ORAL 3 TIMES DAILY
Qty: 15 TABLET | Refills: 0 | Status: SHIPPED | OUTPATIENT
Start: 2025-06-26

## 2025-06-26 RX ORDER — CEFAZOLIN 2 G/1
2 INJECTION, POWDER, FOR SOLUTION INTRAMUSCULAR; INTRAVENOUS
Status: COMPLETED | OUTPATIENT
Start: 2025-06-26 | End: 2025-06-26

## 2025-06-26 RX ORDER — FENTANYL CITRATE 50 UG/ML
INJECTION, SOLUTION INTRAMUSCULAR; INTRAVENOUS
Status: DISCONTINUED | OUTPATIENT
Start: 2025-06-26 | End: 2025-06-26

## 2025-06-26 RX ORDER — HYDROMORPHONE HYDROCHLORIDE 2 MG/ML
0.4 INJECTION, SOLUTION INTRAMUSCULAR; INTRAVENOUS; SUBCUTANEOUS EVERY 5 MIN PRN
Refills: 0 | Status: DISCONTINUED | OUTPATIENT
Start: 2025-06-26 | End: 2025-06-26 | Stop reason: HOSPADM

## 2025-06-26 RX ORDER — DEXAMETHASONE SODIUM PHOSPHATE 4 MG/ML
INJECTION, SOLUTION INTRA-ARTICULAR; INTRALESIONAL; INTRAMUSCULAR; INTRAVENOUS; SOFT TISSUE
Status: DISCONTINUED | OUTPATIENT
Start: 2025-06-26 | End: 2025-06-26

## 2025-06-26 RX ORDER — BUPIVACAINE 13.3 MG/ML
INJECTION, SUSPENSION, LIPOSOMAL INFILTRATION
Status: DISCONTINUED
Start: 2025-06-26 | End: 2025-06-26 | Stop reason: WASHOUT

## 2025-06-26 RX ORDER — ONDANSETRON HYDROCHLORIDE 2 MG/ML
INJECTION, SOLUTION INTRAVENOUS
Status: DISCONTINUED | OUTPATIENT
Start: 2025-06-26 | End: 2025-06-26

## 2025-06-26 RX ORDER — MIDAZOLAM HYDROCHLORIDE 1 MG/ML
2 INJECTION INTRAMUSCULAR; INTRAVENOUS EVERY 5 MIN PRN
Status: DISCONTINUED | OUTPATIENT
Start: 2025-06-26 | End: 2025-06-26 | Stop reason: HOSPADM

## 2025-06-26 RX ORDER — HYDROCODONE BITARTRATE AND ACETAMINOPHEN 5; 325 MG/1; MG/1
1 TABLET ORAL EVERY 4 HOURS PRN
Refills: 0 | Status: DISCONTINUED | OUTPATIENT
Start: 2025-06-26 | End: 2025-06-26 | Stop reason: HOSPADM

## 2025-06-26 RX ORDER — KETOROLAC TROMETHAMINE 30 MG/ML
INJECTION, SOLUTION INTRAMUSCULAR; INTRAVENOUS
Status: DISCONTINUED
Start: 2025-06-26 | End: 2025-06-26 | Stop reason: HOSPADM

## 2025-06-26 RX ORDER — EPINEPHRINE 1 MG/ML
INJECTION, SOLUTION, CONCENTRATE INTRAVENOUS
Status: DISCONTINUED
Start: 2025-06-26 | End: 2025-06-26 | Stop reason: HOSPADM

## 2025-06-26 RX ORDER — KETAMINE HYDROCHLORIDE 50 MG/ML
INJECTION, SOLUTION INTRAMUSCULAR; INTRAVENOUS
Status: DISCONTINUED | OUTPATIENT
Start: 2025-06-26 | End: 2025-06-26

## 2025-06-26 RX ORDER — SEVOFLURANE 250 ML/250ML
LIQUID RESPIRATORY (INHALATION)
Status: DISCONTINUED
Start: 2025-06-26 | End: 2025-06-26 | Stop reason: HOSPADM

## 2025-06-26 RX ORDER — SODIUM CHLORIDE 0.9 % (FLUSH) 0.9 %
3 SYRINGE (ML) INJECTION EVERY 6 HOURS PRN
Status: DISCONTINUED | OUTPATIENT
Start: 2025-06-26 | End: 2025-06-26 | Stop reason: HOSPADM

## 2025-06-26 RX ADMIN — FENTANYL CITRATE 50 MCG: 50 INJECTION, SOLUTION INTRAMUSCULAR; INTRAVENOUS at 06:06

## 2025-06-26 RX ADMIN — METHOCARBAMOL 1000 MG: 100 INJECTION INTRAMUSCULAR; INTRAVENOUS at 08:06

## 2025-06-26 RX ADMIN — KETOROLAC TROMETHAMINE 15 MG: 30 INJECTION, SOLUTION INTRAMUSCULAR; INTRAVENOUS at 08:06

## 2025-06-26 RX ADMIN — MORPHINE SULFATE 3 MG: 4 INJECTION INTRAVENOUS at 08:06

## 2025-06-26 RX ADMIN — ONDANSETRON HYDROCHLORIDE 4 MG: 2 SOLUTION INTRAMUSCULAR; INTRAVENOUS at 07:06

## 2025-06-26 RX ADMIN — CEFAZOLIN 2 G: 2 INJECTION, POWDER, FOR SOLUTION INTRAMUSCULAR; INTRAVENOUS at 06:06

## 2025-06-26 RX ADMIN — HYDROCODONE BITARTRATE AND ACETAMINOPHEN 1 TABLET: 5; 325 TABLET ORAL at 09:06

## 2025-06-26 RX ADMIN — ACETAMINOPHEN 1000 MG: 10 INJECTION INTRAVENOUS at 06:06

## 2025-06-26 RX ADMIN — HYDROMORPHONE HYDROCHLORIDE 0.4 MG: 2 INJECTION INTRAMUSCULAR; INTRAVENOUS; SUBCUTANEOUS at 08:06

## 2025-06-26 RX ADMIN — PROPOFOL 150 MG: 10 INJECTION, EMULSION INTRAVENOUS at 06:06

## 2025-06-26 RX ADMIN — ROCURONIUM BROMIDE 50 MG: 10 SOLUTION INTRAVENOUS at 06:06

## 2025-06-26 RX ADMIN — LIDOCAINE HYDROCHLORIDE 50 MG: 20 INJECTION INTRAVENOUS at 06:06

## 2025-06-26 RX ADMIN — KETAMINE HYDROCHLORIDE 15 MG: 50 INJECTION INTRAMUSCULAR; INTRAVENOUS at 07:06

## 2025-06-26 RX ADMIN — SUGAMMADEX 200 MG: 100 INJECTION, SOLUTION INTRAVENOUS at 07:06

## 2025-06-26 RX ADMIN — MIDAZOLAM 2 MG: 1 INJECTION INTRAMUSCULAR; INTRAVENOUS at 06:06

## 2025-06-26 RX ADMIN — Medication 100 MCG: at 06:06

## 2025-06-26 RX ADMIN — FENTANYL CITRATE 50 MCG: 50 INJECTION, SOLUTION INTRAMUSCULAR; INTRAVENOUS at 07:06

## 2025-06-26 RX ADMIN — KETAMINE HYDROCHLORIDE 10 MG: 50 INJECTION INTRAMUSCULAR; INTRAVENOUS at 07:06

## 2025-06-26 RX ADMIN — DEXAMETHASONE SODIUM PHOSPHATE 8 MG: 4 INJECTION, SOLUTION INTRA-ARTICULAR; INTRALESIONAL; INTRAMUSCULAR; INTRAVENOUS; SOFT TISSUE at 06:06

## 2025-06-26 RX ADMIN — ONDANSETRON 4 MG: 2 INJECTION INTRAMUSCULAR; INTRAVENOUS at 09:06

## 2025-06-26 RX ADMIN — SODIUM CHLORIDE, SODIUM GLUCONATE, SODIUM ACETATE, POTASSIUM CHLORIDE AND MAGNESIUM CHLORIDE: 526; 502; 368; 37; 30 INJECTION, SOLUTION INTRAVENOUS at 06:06

## 2025-06-26 NOTE — ANESTHESIA PROCEDURE NOTES
Intubation    Date/Time: 6/26/2025 6:56 AM    Performed by: Deidre Overton CRNA  Authorized by: Javan Carson MD    Intubation:     Induction:  Intravenous    Intubated:  Postinduction    Mask Ventilation:  Easy mask    Attempts:  1    Attempted By:  CRNA    Method of Intubation:  Direct    Blade:  Gonzalez 2    Laryngeal View Grade: Grade I - full view of cords      Difficult Airway Encountered?: No      Complications:  None    Airway Device:  Oral endotracheal tube    Airway Device Size:  7.0    Style/Cuff Inflation:  Cuffed (inflated to minimal occlusive pressure) (inflated to 40gkV47, verified with manometer)    Inflation Amount (mL):  6    Tube secured:  22    Secured at:  The lips    Placement Verified By:  Capnometry    Complicating Factors:  None    Findings Post-Intubation:  BS equal bilateral and atraumatic/condition of teeth unchanged

## 2025-06-26 NOTE — DISCHARGE SUMMARY
[facility] - Periop Services  Discharge Note  Short Stay    Procedure(s) (LRB):  ARTHROSCOPY, KNEE, W/ CHONDROPLASTY - tibial plateau (Right)  REMOVAL, HARDWARE, LOWER EXTREMITY - tibial hardware (Right)      OUTCOME: Patient tolerated treatment/procedure well without complication and is now ready for discharge.    DISPOSITION: Home or Self Care    FINAL DIAGNOSIS:  <principal problem not specified>    FOLLOWUP: In clinic    DISCHARGE INSTRUCTIONS:  No discharge procedures on file.     TIME SPENT ON DISCHARGE: 10 minutes

## 2025-06-26 NOTE — ANESTHESIA PREPROCEDURE EVALUATION
06/26/2025  Lyssa Paul is a 44 y.o., female.  Procedure Information    Case: [id number] Date/Time: 06/26/25 0700   Procedures:      ARTHROSCOPY, KNEE, W/ CHONDROPLASTY - tibial plateau (Right)      REMOVAL, HARDWARE, LOWER EXTREMITY - tibial hardware (Right)   Anesthesia type: General   Diagnosis: Painful orthopaedic hardware [T84.84XA]   Pre-op diagnosis: Painful orthopaedic hardware [T84.84XA]   Location: [facility] OR 06 / [facility] OR   Surgeons: Rodri Gresham Jr., MD       Pre-op Assessment    I have reviewed the Patient Summary Reports.     I have reviewed the Nursing Notes. I have reviewed the NPO Status.   I have reviewed the Medications.     Review of Systems  Anesthesia Hx:  No problems with previous Anesthesia                Hematology/Oncology:  Hematology Normal   Oncology Normal                                   EENT/Dental:  EENT/Dental Normal           Cardiovascular:  Cardiovascular Normal Exercise tolerance: good                     Functional Capacity good / => 4 METS                         Pulmonary:  Pulmonary Normal                       Renal/:   Denies Chronic Renal Disease.                Hepatic/GI:  Hepatic/GI Normal                    Musculoskeletal:  Musculoskeletal Normal                Neurological:  Neurology Normal                                      Endocrine:   Hypothyroidism        Denies Morbid Obesity / BMI > 40  Dermatological:  Skin Normal    Psych:   anxiety               Test Reason : Z01.818,Z79.01,    Vent. Rate : 077 BPM     Atrial Rate : 077 BPM     P-R Int : 130 ms          QRS Dur : 070 ms      QT Int : 372 ms       P-R-T Axes : 072 079 078 degrees     QTc Int : 420 ms    Normal sinus rhythm  Normal ECG  No previous ECGs available  Confirmed by Parveen Champagne MD ([id number]) on 2/7/2023 12:00:13 AM    Referred By: RONNIE POLLARD           Confirmed By:Parveen  Ihsan MARCH     Specimen Collected: 02/06/23 08:45 CST Last Resulted: 02/07/23 00:00 CST          Latest Reference Range & Units 10/14/24 09:58   WBC 3.4 - 10.8 x10E3/uL 5.2 (E)   Hemoglobin 11.1 - 15.9 g/dL 13.6 (E)   Hematocrit 34.0 - 46.6 % 42.1 (E)   MCH 26.6 - 33.0 pg 31.6 (E)   MCHC 31.5 - 35.7 g/dL 32.3 (E)   RDW 11.7 - 15.4 % 12.7 (E)   Platelet Count 150 - 450 x10E3/uL 224 (E)   Neutrophils Relative Not Estab. % 68 (E)   Lymphocytes % Not Estab. % 20 (E)   Eos % Not Estab. % 2 (E)   Immature Granulocytes Not Estab. % 0 (E)   Neutrophils, Abs 1.4 - 7.0 x10E3/uL 3.5 (E)   Lymphocytes Absolute 0.7 - 3.1 x10E3/uL 1.1 (E)   Eos # 0.0 - 0.4 x10E3/uL 0.1 (E)   Baso # 0.0 - 0.2 x10E3/uL 0 (E)   Iron 27 - 159 ug/dL 143 (E)   TIBC 250 - 450 ug/dL 322 (E)   UIBC 131 - 425 ug/dL 179 (E)   Iron Saturation 15 - 55 % 44 (E)   Estradiol pg/mL 38.5 (E)   FSH mIU/mL 20.5 (E)   Basophils, CSF % Not Estab. % 1 (E)   (E): External lab result    Physical Exam  General: Alert, Oriented, Well nourished and Cooperative    Airway:  Mallampati: II   Mouth Opening: Normal  TM Distance: Normal  Tongue: Normal  Neck ROM: Normal ROM    Dental:  Intact    Chest/Lungs:  Clear to auscultation, Normal Respiratory Rate    Heart:  Rate: Normal  Rhythm: Regular Rhythm        Anesthesia Plan  Type of Anesthesia, risks & benefits discussed:    Anesthesia Type: Gen Supraglottic Airway  Intra-op Monitoring Plan: Standard ASA Monitors  Post Op Pain Control Plan: multimodal analgesia  Induction:  IV and Inhalation  Airway Plan: Direct  Informed Consent: Informed consent signed with the Patient and all parties understand the risks and agree with anesthesia plan.  All questions answered. Patient consented to blood products? Yes  ASA Score: 2  Day of Surgery Review of History & Physical: H&P Update referred to the surgeon/provider.I have interviewed and examined the patient. I have reviewed the patient's H&P dated: There are no significant changes.      Ready For Surgery From Anesthesia Perspective.     .

## 2025-06-26 NOTE — DISCHARGE INSTRUCTIONS
[facility]  Rodri Gresham Jr., MD  [address] [place],  [id number]  [phone], fax [phone]      KNEE ARTHROSCOPY    DIET:  Following general anesthesia, start with clear liquids to decrease chances of nausea.  Begin with water, coffee, tea, ginger ale, sprite, or apple juice.  If tolerated, advance to Jell-o, soup, crackers, or toast.  Once these are tolerated, advance to a regular diet.    DRESSING:  Keep the dressing clean and dry.  Remove the dressing on the 3rd day after surgery and replace the gauze with bandaids.  If you have steri-strips or band-aids in place of stitches, allow them to stay in place as long as possible.  They usually fall off on their own within 7-10 days.  You may trim the edges as the steri-strips begin to curl.  Steri-strips can get wet in the shower-pat dry with a towel after showers.    SHOWERING:  You may shower 5 days after surgery.  Remove the dressing or band-aids before showering.  Leave the incisions open to air after showering.  You can cover the sutures with band-aids if clothing irritates the stitches.  You do not need to reapply a dressing, but you may do so if you continue to have drainage.  It is not uncommon to have drainage a few days after surgery.      ACTIVITY:            -  Ice should be applied to the knee for 30 minutes, 5-6 times per day, for the 1st week to help decrease pain and swelling.  After the first week, apply as needed, especially after exercises and physical therapy.            -  Elevate the knee with 2-3 pillows under the ANKLE.  Do not elevate with pillows under the knee, this will keep the knee in a bent position.  It is important that the knee can be fully straightened in the early post op period.            -  Use a cane or crutches for comfort only.  You can stop using them when you are walking without a limp.  There are no restrictions on your walking.            -  You can bear full weight on your leg.       PAIN MEDICATION:       -  Use the Percocet as prescribed for pain after surgery.  Pain medicine can cause nausea and vomiting, especially on an empty stomach.       -  In addition, you can take Ketorolac as prescribed or Iburpofen 200 mg, 4 pills every 8 hours.  Ketorolac or Iburpofen medicine can irritate your stomach or cause heartburn.  If this happens to you, stop taking the medicine.       -  Ice and elevation are more useful than pain medicine for surgical pain.  If you are having too much pain or discomfort, try more ice and higher elevation.       -  Do NOT drink alcohol while taking pain medication.    BLOOD CLOT PREVENTION:  If you are aware that you are at high risk for blood blots, notify your physician.  In general, you should walk as much as possible after surgery to increase blood circulation throughout your body. Most patients will take Aspirin 81 mg, 1 pill twice a day for 2 weeks to help further prevent blood clots.    DRIVING OR FLYING:  You must NEVER drive while taking narcotic pain medication  You may ride in a car, take a train, or fly once you feel comfortable    PHYSICAL THERAPY:  Take your physical therapy prescription to the physical therapy clinic of your choice.  Make an appointment 1-2 days after surgery.  All exercises and activities must be within the protocol until rehab is complete.  Some patients will not start physical therapy until after their first follow up appointment.    WORK OR SCHOOL:  You may return to an office job or school whenver comfortable.  Most patients return ~ 1 week after surgery.  For more active jobs that require extended walking, squatting, or lifting, you can wait until after your follow up appointment.  Any other types of jobs should be discussed with Dr. Gresham to determine a date for return to work.    PROBLEMS TO REPORT:       1.  Fever greater than 102 F       2.  Incision that is very red and/or draining pus       3.  Unable to urinate within 8  hours of surgery (a rare effect of being put to sleep for surgery)  Calls should be directed to the clinic:  [phone]    RETURN APPOINTMENT:  To confirm or reschedule your appointment, call [phone]

## 2025-06-26 NOTE — ANESTHESIA POSTPROCEDURE EVALUATION
Anesthesia Post Evaluation    Patient: Lyssa Paul    Procedure(s) Performed: Procedure(s) (LRB):  ARTHROSCOPY, KNEE, W/ CHONDROPLASTY - tibial plateau (Right)  REMOVAL, HARDWARE, LOWER EXTREMITY - tibial hardware (Right)    Final Anesthesia Type: general      Patient location during evaluation: PACU  Patient participation: Yes- Able to Participate  Level of consciousness: awake and alert and oriented  Post-procedure vital signs: reviewed and stable  Pain management: adequate  Airway patency: patent  WESTON mitigation strategies: Verification of full reversal of neuromuscular block  PONV status at discharge: No PONV  Anesthetic complications: no      Cardiovascular status: blood pressure returned to baseline and stable  Respiratory status: spontaneous ventilation and unassisted  Hydration status: euvolemic  Follow-up not needed.  Comments: [facility]              Vitals Value Taken Time   BP 93/63 06/26/25 09:31   Temp 36 °C (96.8 °F) 06/26/25 07:49   Pulse 93 06/26/25 09:39   Resp 20 06/26/25 09:51   SpO2 98 % 06/26/25 09:39   Vitals shown include unfiled device data.      Event Time   Out of Recovery 08:25:00         Pain/Elroy Score: Pain Rating Prior to Med Admin: 4 (6/26/2025  9:51 AM)  Elroy Score: 10 (6/26/2025 10:03 AM)  Modified Elroy Score: 20 (6/26/2025 10:03 AM)

## 2025-06-26 NOTE — TRANSFER OF CARE
Anesthesia Transfer of Care Note    Patient: Lyssa Paul    Procedure(s) Performed: Procedure(s) (LRB):  ARTHROSCOPY, KNEE, W/ CHONDROPLASTY - tibial plateau (Right)  REMOVAL, HARDWARE, LOWER EXTREMITY - tibial hardware (Right)    Patient location: PACU    Anesthesia Type: general    Transport from OR: Transported from OR on room air with adequate spontaneous ventilation    Post pain: adequate analgesia    Post assessment: no apparent anesthetic complications    Post vital signs: stable    Level of consciousness: sedated    Nausea/Vomiting: no nausea/vomiting    Complications: none    Transfer of care protocol was followed      Last vitals: Visit Vitals  /70   Pulse 74   Temp 36 °C (96.8 °F)   Resp 17   Ht 5' (1.524 m)   Wt 55.6 kg (122 lb 9.2 oz)   LMP 03/04/2025   SpO2 100%   Breastfeeding No   BMI 23.94 kg/m²

## 2025-06-26 NOTE — OP NOTE
DATE OF SERVICE: 06/26/2025    SURGEON: Rodri Gresham MD     ASSISTANT: Krystin Valladares NP. Mrs. Valladares was essential in manipulating the leg while I performed the arthroscopy, retraction, and closure.    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 Right knee arthroscopic shaving chondroplasty of tibia plateau and trochlea  2. Right knee deep hardware removal    ESTIMATED BLOOD LOSS: 10cc.    COMPLICATIONS: None.    TOURNIQUET TIME: 20 minutes.    ANTIBIOTICS: Ancef     INDICATIONS FOR PROCEDURE: Lyssa Paul is a 44 y.o. year old who has had ongoing right knee pain. The patient has had to limit activity due to intermittent pain & occasional mechanical symptoms. An MRI was performed that showed a cartilage tear that I felt would be amenable to arthroscopic debridement. The patient decided to opt for surgery after failing conservative management.    OPERATIVE REPORT: Lyssa Paul was initially seen in the preoperative holding area where history and physical were reviewed without change. The operative leg was marked, consents were reviewed, and any questions were answered for the patient and family. The patient was then taken back to the operating room, placed supine on the operating table with all bony prominences padded. Then a nonsterile tourniquet was placed around the upper thigh. The patient was induced under general anesthesia. The operative lower extremity was prepped and draped in standard sterile fashion. A timeout led by the surgeon was performed, and preoperative antibiotics were given. The limp was exsanguinated with gravity. The tourniquet was raised to 100 mmHg over the systolic blood pressure.    I started by removing the hardware.  I made an incision over the proximal tibia sharply incised  through skin and subcutaneous tissue.  I came down onto the button.  The knot stack was removed and the button was removed.  This was confirmed with fluoroscopy.    The knee was then flexed and the inferolateral portal was created with an #11 blade scalpel.    The camera was introduced, using the blunt trocar, into the suprapatellar pouch. The undersurface of the patella was found to have minmal chondral wear and the trochlear groove was found to have focal grade 2/3 chondral wear with an unstable flap. The camera was then taken down into the lateral gutter, where no loose bodies and no plica were found. The camera was then brought into the medial gutter, where no loose bodies and a plica were seen.  Plica was removed with a shaver The camera was then brought into the medial compartment.    An inferomedial portal was then localized with a spinal needle, and created using an #11 blade. The medial meniscus was probed and found to have a no tears. The medial femoral condyle was found to have no wear. The medial tibial plateau demonstrated no wear.    The camera was then turned to the notch, where the ACL graft was found to be intact.    Then the leg was brought into figure-of-four position. The camera was brought into the lateral compartment. The lateral meniscus was probed and found to have no tears.   The lateral femoral condyle was found to have no chondral wear. The lateral tibial plateau had focal grade 2 chondral wear.  Unstable flap was debrided with a shaver.    The camera was brought up into the patellofemoral joint once more, with the knee in extension and shaving chondroplasty was performed on the trochlea until smooth and stable cartilage margins were present.    The knee was once more examined for loose bodies, of which none were found. The knee was then evacuated of all fluids. The portals were closed with 3-0 monocyrl interrupted sutures and steristrips. 10mL of 0.25% Bupivicaine were infiltrated around  each portal. A sterile dressing was placed, and the tourniquet was deflated after 20 minutes. The patient was awakened from anesthesia and taken to the postoperative care unit in stable condition.

## 2025-07-18 ENCOUNTER — OFFICE VISIT (OUTPATIENT)
Dept: ORTHOPEDICS | Facility: CLINIC | Age: 45
End: 2025-07-18
Payer: COMMERCIAL

## 2025-07-18 VITALS
DIASTOLIC BLOOD PRESSURE: 71 MMHG | BODY MASS INDEX: 24.06 KG/M2 | WEIGHT: 122.56 LBS | SYSTOLIC BLOOD PRESSURE: 117 MMHG | HEART RATE: 91 BPM | HEIGHT: 60 IN

## 2025-07-18 DIAGNOSIS — M94.269 TIBIAL PLATEAU CHONDROMALACIA: ICD-10-CM

## 2025-07-18 DIAGNOSIS — T84.84XA PAINFUL ORTHOPAEDIC HARDWARE: Primary | ICD-10-CM

## 2025-07-18 NOTE — PROGRESS NOTES
Chief Complaint:   Chief Complaint   Patient presents with    Post-op Evaluation     P/op Rt knee tib plateau chondroplasty, HWR - sx 6/26/25 - gl. 8/10/25 - patient is doing well and reports no pain, patient reports that PT is beneficial       History of present illness:  6/26/25:  Pelvic shaving chondroplasty tibial plateau and trochlea, right knee deep hardware removal    She returns today.  She has had almost complete relief.  Working with physical therapy.  Overall doing well and happy with the results    Musculoskeletal:   Incisions healed. Without erythema, drainage, or signs of infection. Distally neurovascular intact.  Range of motion right knee full           Assessment: Painful orthopaedic hardware    Tibial plateau chondromalacia        Plan:  Doing well status post above.  She can continue PT and transition to home exercises when ready.  She can follow up if she has any issues

## 2025-07-18 NOTE — PROGRESS NOTES
Chief Complaint: No chief complaint on file.      History of present illness:  6/26/25:  Pelvic shaving chondroplasty tibial plateau and trochlea, right knee deep hardware removal    She returns today.  She has had almost complete relief.  Working with physical therapy.  Overall doing well and happy with the results    Musculoskeletal:   Incisions healed. Without erythema, drainage, or signs of infection. Distally neurovascular intact.  Range of motion right knee full           Assessment: Painful orthopaedic hardware    Tibial plateau chondromalacia        Plan:  Doing well status post above.  She can continue PT and transition to home exercises when ready.  She can follow up if she has any issues

## (undated) DEVICE — PEROXIDE HYDROGEN 3% 16OZ

## (undated) DEVICE — GLOVE PROTEXIS LTX MICRO 6.5

## (undated) DEVICE — GLOVE PROTEXIS BLUE LATEX 6.5

## (undated) DEVICE — GLOVE 6.5 PROTEXIS PI BLUE

## (undated) DEVICE — HANDPIECE HYDRODEBRIDER

## (undated) DEVICE — GLOVE PROTEXIS LTX MICRO 8

## (undated) DEVICE — KIT SURGICAL TURNOVER

## (undated) DEVICE — SOL NACL IRR 1000ML BTL

## (undated) DEVICE — GOWN POLY REINF X-LONG 2XL

## (undated) DEVICE — GLOVE PROTEXIS BLUE LATEX 7

## (undated) DEVICE — APPLICATOR CHLORAPREP ORN 26ML

## (undated) DEVICE — NDL SPINAL 22GA 3.5 IN QUINCKE

## (undated) DEVICE — SOL NORMAL USPCA 0.9%

## (undated) DEVICE — PACKING NASOPORE NASAL STD 8CM

## (undated) DEVICE — STOCKINETTE IMPERVIOUS LARGE

## (undated) DEVICE — Device

## (undated) DEVICE — DRAPE MEDIUM SHEET 40X70IN

## (undated) DEVICE — COVER EQUIPMENT 36X25

## (undated) DEVICE — TUBE SUCTION MEDI-VAC STERILE

## (undated) DEVICE — SUT PLN GUT 4-0 SC-1SC-1 1

## (undated) DEVICE — PACK SURGICAL KNEE SCOPE

## (undated) DEVICE — SUT 5-0 CHROMIC GUT / P-3

## (undated) DEVICE — SOL NACL IRR 3000ML

## (undated) DEVICE — SET TUBE (1) SMARTSITE PRT 104

## (undated) DEVICE — DRAPE U-DRAPE ADHESIVE 60X60IN

## (undated) DEVICE — PADDING CAST SOFT-ROLL 6 X 4

## (undated) DEVICE — PAD PREP CUFFED NS 24X48IN

## (undated) DEVICE — SEE MEDLINE ITEM 157059

## (undated) DEVICE — GLOVE PROTEXIS LTX MICRO  7

## (undated) DEVICE — NDL MAGELLAN SAFETY 18G 1.5IN

## (undated) DEVICE — DRAPE INCISE IOBAN 2 13X13IN

## (undated) DEVICE — SPLINT FIBERGLASS PAD 4X15

## (undated) DEVICE — CUFF TOURNIQUET STER DIS 34

## (undated) DEVICE — KIT ANTIFOG W/SPONG & FLUID

## (undated) DEVICE — DRAPE FULL SHEET 70X100IN

## (undated) DEVICE — GLOVE PROTEXIS HYDROGEL SZ6

## (undated) DEVICE — SUPPORT ULNA NERVE PROTECTOR

## (undated) DEVICE — PAD ABDOMINAL STERILE 8X10IN

## (undated) DEVICE — DRAPE ORTH SPLIT 77X108IN

## (undated) DEVICE — SUT MONOCRYL 2-0 S UND

## (undated) DEVICE — PADDING WYTEX UNDRCST 6INX4YD

## (undated) DEVICE — BANDAGE COMPR VELCLOSE 4INX5YD

## (undated) DEVICE — STRIP MEDI WND CLSR 1/2X4IN

## (undated) DEVICE — SUT MONOCRYL 3-0 PS-2 UND

## (undated) DEVICE — DRESSING NASOPORE 8CM BIORSRBL

## (undated) DEVICE — ELECTRODE PATIENT RETURN DISP

## (undated) DEVICE — GLOVE PROTEXIS HYDROGEL SZ8

## (undated) DEVICE — DRAPE STERI U-SHAPED 47X51IN

## (undated) DEVICE — TUBE SET INFLOW/OUTFLOW

## (undated) DEVICE — DRESSING XEROFORM NONADH 1X8IN

## (undated) DEVICE — BLADE SHAVER LANZA 4.2X13CM

## (undated) DEVICE — SUT 3-0 MONOCRYL PLUS PS-2

## (undated) DEVICE — SPONGE COTTON TRAY 4X4IN

## (undated) DEVICE — POSITIONER HEAD ADULT